# Patient Record
Sex: FEMALE | Race: OTHER | HISPANIC OR LATINO
[De-identification: names, ages, dates, MRNs, and addresses within clinical notes are randomized per-mention and may not be internally consistent; named-entity substitution may affect disease eponyms.]

---

## 2017-05-19 ENCOUNTER — LABORATORY RESULT (OUTPATIENT)
Age: 22
End: 2017-05-19

## 2017-05-19 ENCOUNTER — APPOINTMENT (OUTPATIENT)
Dept: MATERNAL FETAL MEDICINE | Facility: CLINIC | Age: 22
End: 2017-05-19

## 2017-05-19 ENCOUNTER — APPOINTMENT (OUTPATIENT)
Dept: OBGYN | Facility: CLINIC | Age: 22
End: 2017-05-19

## 2017-05-19 ENCOUNTER — OUTPATIENT (OUTPATIENT)
Dept: OUTPATIENT SERVICES | Facility: HOSPITAL | Age: 22
LOS: 1 days | End: 2017-05-19
Payer: COMMERCIAL

## 2017-05-19 VITALS
HEIGHT: 61 IN | BODY MASS INDEX: 26.66 KG/M2 | SYSTOLIC BLOOD PRESSURE: 106 MMHG | WEIGHT: 141.2 LBS | DIASTOLIC BLOOD PRESSURE: 70 MMHG

## 2017-05-19 VITALS — WEIGHT: 140 LBS | DIASTOLIC BLOOD PRESSURE: 80 MMHG | SYSTOLIC BLOOD PRESSURE: 118 MMHG

## 2017-05-19 DIAGNOSIS — Z34.90 ENCOUNTER FOR SUPERVISION OF NORMAL PREGNANCY, UNSPECIFIED, UNSPECIFIED TRIMESTER: ICD-10-CM

## 2017-05-19 DIAGNOSIS — Z98.89 OTHER SPECIFIED POSTPROCEDURAL STATES: Chronic | ICD-10-CM

## 2017-05-19 DIAGNOSIS — Z34.91 ENCOUNTER FOR SUPERVISION OF NORMAL PREGNANCY, UNSPECIFIED, FIRST TRIMESTER: ICD-10-CM

## 2017-05-19 DIAGNOSIS — O21.0 MILD HYPEREMESIS GRAVIDARUM: ICD-10-CM

## 2017-05-19 DIAGNOSIS — Z86.32 PERSONAL HISTORY OF GESTATIONAL DIABETES: ICD-10-CM

## 2017-05-19 LAB
HCT VFR BLD CALC: 39.2 % — SIGNIFICANT CHANGE UP (ref 34.5–45)
HGB BLD-MCNC: 12.5 G/DL — SIGNIFICANT CHANGE UP (ref 11.5–15.5)
MCHC RBC-ENTMCNC: 29.5 PG — SIGNIFICANT CHANGE UP (ref 27–34)
MCHC RBC-ENTMCNC: 31.9 GM/DL — LOW (ref 32–36)
MCV RBC AUTO: 92.5 FL — SIGNIFICANT CHANGE UP (ref 80–100)
PLATELET # BLD AUTO: 239 K/UL — SIGNIFICANT CHANGE UP (ref 150–400)
RBC # BLD: 4.24 M/UL — SIGNIFICANT CHANGE UP (ref 3.8–5.2)
RBC # FLD: 13.7 % — SIGNIFICANT CHANGE UP (ref 10.3–14.5)
WBC # BLD: 7.69 K/UL — SIGNIFICANT CHANGE UP (ref 3.8–10.5)
WBC # FLD AUTO: 7.69 K/UL — SIGNIFICANT CHANGE UP (ref 3.8–10.5)

## 2017-05-19 PROCEDURE — 86762 RUBELLA ANTIBODY: CPT

## 2017-05-19 PROCEDURE — 87389 HIV-1 AG W/HIV-1&-2 AB AG IA: CPT

## 2017-05-19 PROCEDURE — 83036 HEMOGLOBIN GLYCOSYLATED A1C: CPT

## 2017-05-19 PROCEDURE — 86592 SYPHILIS TEST NON-TREP QUAL: CPT

## 2017-05-19 PROCEDURE — 76817 TRANSVAGINAL US OBSTETRIC: CPT

## 2017-05-19 PROCEDURE — 84443 ASSAY THYROID STIM HORMONE: CPT

## 2017-05-19 PROCEDURE — G0463: CPT

## 2017-05-19 PROCEDURE — 86480 TB TEST CELL IMMUN MEASURE: CPT

## 2017-05-19 PROCEDURE — 87086 URINE CULTURE/COLONY COUNT: CPT

## 2017-05-19 PROCEDURE — 85027 COMPLETE CBC AUTOMATED: CPT

## 2017-05-19 PROCEDURE — 87340 HEPATITIS B SURFACE AG IA: CPT

## 2017-05-19 PROCEDURE — 82950 GLUCOSE TEST: CPT

## 2017-05-19 PROCEDURE — 86900 BLOOD TYPING SEROLOGIC ABO: CPT

## 2017-05-19 PROCEDURE — 83655 ASSAY OF LEAD: CPT

## 2017-05-19 PROCEDURE — 83020 HEMOGLOBIN ELECTROPHORESIS: CPT | Mod: 26

## 2017-05-19 PROCEDURE — 83020 HEMOGLOBIN ELECTROPHORESIS: CPT

## 2017-05-19 PROCEDURE — 86850 RBC ANTIBODY SCREEN: CPT

## 2017-05-20 LAB
C TRACH RRNA SPEC QL NAA+PROBE: SIGNIFICANT CHANGE UP
CULTURE RESULTS: NO GROWTH — SIGNIFICANT CHANGE UP
GLUCOSE 1H P MEAL SERPL-MCNC: 102 MG/DL — SIGNIFICANT CHANGE UP (ref 70–134)
HBA1C BLD-MCNC: 5.6 % — SIGNIFICANT CHANGE UP (ref 4–5.6)
HBV SURFACE AG SER-ACNC: SIGNIFICANT CHANGE UP
HIV 1+2 AB+HIV1 P24 AG SERPL QL IA: SIGNIFICANT CHANGE UP
N GONORRHOEA RRNA SPEC QL NAA+PROBE: SIGNIFICANT CHANGE UP
RPR SERPL-ACNC: SIGNIFICANT CHANGE UP
RUBV IGG SER-ACNC: 9.9 INDEX — SIGNIFICANT CHANGE UP
RUBV IGG SER-IMP: POSITIVE — SIGNIFICANT CHANGE UP
SPECIMEN SOURCE: SIGNIFICANT CHANGE UP
SPECIMEN SOURCE: SIGNIFICANT CHANGE UP
TSH SERPL-MCNC: 0.4 UIU/ML — SIGNIFICANT CHANGE UP (ref 0.27–4.2)

## 2017-05-21 LAB — LEAD BLD-MCNC: 1 UG/DL — SIGNIFICANT CHANGE UP (ref 0–19)

## 2017-05-22 LAB
HEMOGLOBIN INTERPRETATION: SIGNIFICANT CHANGE UP
HGB A MFR BLD: 46.2 % — LOW (ref 95.8–98)
HGB A2 MFR BLD: 2.6 % — SIGNIFICANT CHANGE UP (ref 2–3.2)
HGB F MFR BLD: 0.6 % — SIGNIFICANT CHANGE UP (ref 0–1)
HGB OTHER MFR BLD ELPH: 50.6 % — HIGH

## 2017-05-23 LAB
M TB TUBERC IFN-G BLD QL: -0.01 IU/ML — SIGNIFICANT CHANGE UP
M TB TUBERC IFN-G BLD QL: 0.05 IU/ML — SIGNIFICANT CHANGE UP
M TB TUBERC IFN-G BLD QL: NEGATIVE — SIGNIFICANT CHANGE UP
MITOGEN IGNF BCKGRD COR BLD-ACNC: >10 IU/ML — SIGNIFICANT CHANGE UP

## 2017-05-24 ENCOUNTER — ASOB RESULT (OUTPATIENT)
Age: 22
End: 2017-05-24

## 2017-05-24 ENCOUNTER — APPOINTMENT (OUTPATIENT)
Dept: ANTEPARTUM | Facility: CLINIC | Age: 22
End: 2017-05-24

## 2017-06-16 ENCOUNTER — LABORATORY RESULT (OUTPATIENT)
Age: 22
End: 2017-06-16

## 2017-06-16 ENCOUNTER — APPOINTMENT (OUTPATIENT)
Dept: OBGYN | Facility: CLINIC | Age: 22
End: 2017-06-16

## 2017-06-16 ENCOUNTER — OUTPATIENT (OUTPATIENT)
Dept: OUTPATIENT SERVICES | Facility: HOSPITAL | Age: 22
LOS: 1 days | End: 2017-06-16
Payer: COMMERCIAL

## 2017-06-16 VITALS — WEIGHT: 138 LBS | DIASTOLIC BLOOD PRESSURE: 70 MMHG | SYSTOLIC BLOOD PRESSURE: 104 MMHG

## 2017-06-16 DIAGNOSIS — N94.89 OTHER SPECIFIED CONDITIONS ASSOCIATED WITH FEMALE GENITAL ORGANS AND MENSTRUAL CYCLE: ICD-10-CM

## 2017-06-16 DIAGNOSIS — Z34.80 ENCOUNTER FOR SUPERVISION OF OTHER NORMAL PREGNANCY, UNSPECIFIED TRIMESTER: ICD-10-CM

## 2017-06-16 DIAGNOSIS — N39.0 URINARY TRACT INFECTION, SITE NOT SPECIFIED: ICD-10-CM

## 2017-06-16 DIAGNOSIS — Z98.89 OTHER SPECIFIED POSTPROCEDURAL STATES: Chronic | ICD-10-CM

## 2017-06-16 DIAGNOSIS — L29.9 PRURITUS, UNSPECIFIED: ICD-10-CM

## 2017-06-16 PROCEDURE — 86336 INHIBIN A: CPT

## 2017-06-16 PROCEDURE — G0463: CPT

## 2017-06-16 PROCEDURE — 81003 URINALYSIS AUTO W/O SCOPE: CPT

## 2017-06-20 LAB
1ST TRIMESTER DATA: SIGNIFICANT CHANGE UP
2ND TRIMESTER DATA: SIGNIFICANT CHANGE UP
AFP AMN-MCNC: SIGNIFICANT CHANGE UP
AFP SERPL-ACNC: SIGNIFICANT CHANGE UP
B-HCG FREE SERPL-MCNC: SIGNIFICANT CHANGE UP
B-HCG FREE SERPL-MCNC: SIGNIFICANT CHANGE UP
CLINICAL BIOCHEMIST REVIEW: SIGNIFICANT CHANGE UP
DEMOGRAPHIC DATA: SIGNIFICANT CHANGE UP
INHIBIN A SERPL-MCNC: SIGNIFICANT CHANGE UP
NT: SIGNIFICANT CHANGE UP
PAPP-A SERPL-ACNC: SIGNIFICANT CHANGE UP
SCREEN-FOOTER: SIGNIFICANT CHANGE UP
U ESTRIOL SERPL-SCNC: SIGNIFICANT CHANGE UP

## 2017-06-22 DIAGNOSIS — Z34.90 ENCOUNTER FOR SUPERVISION OF NORMAL PREGNANCY, UNSPECIFIED, UNSPECIFIED TRIMESTER: ICD-10-CM

## 2017-06-22 DIAGNOSIS — Z34.81 ENCOUNTER FOR SUPERVISION OF OTHER NORMAL PREGNANCY, FIRST TRIMESTER: ICD-10-CM

## 2017-06-22 DIAGNOSIS — J01.90 ACUTE SINUSITIS, UNSPECIFIED: ICD-10-CM

## 2017-07-12 ENCOUNTER — OUTPATIENT (OUTPATIENT)
Dept: OUTPATIENT SERVICES | Facility: HOSPITAL | Age: 22
LOS: 1 days | End: 2017-07-12
Payer: COMMERCIAL

## 2017-07-12 ENCOUNTER — APPOINTMENT (OUTPATIENT)
Dept: OBGYN | Facility: CLINIC | Age: 22
End: 2017-07-12

## 2017-07-12 ENCOUNTER — APPOINTMENT (OUTPATIENT)
Dept: ANTEPARTUM | Facility: CLINIC | Age: 22
End: 2017-07-12

## 2017-07-12 ENCOUNTER — ASOB RESULT (OUTPATIENT)
Age: 22
End: 2017-07-12

## 2017-07-12 VITALS — DIASTOLIC BLOOD PRESSURE: 62 MMHG | WEIGHT: 146 LBS | SYSTOLIC BLOOD PRESSURE: 110 MMHG

## 2017-07-12 DIAGNOSIS — Z34.80 ENCOUNTER FOR SUPERVISION OF OTHER NORMAL PREGNANCY, UNSPECIFIED TRIMESTER: ICD-10-CM

## 2017-07-12 DIAGNOSIS — Z98.89 OTHER SPECIFIED POSTPROCEDURAL STATES: Chronic | ICD-10-CM

## 2017-07-12 PROCEDURE — G0463: CPT

## 2017-07-17 DIAGNOSIS — Z34.81 ENCOUNTER FOR SUPERVISION OF OTHER NORMAL PREGNANCY, FIRST TRIMESTER: ICD-10-CM

## 2017-07-18 ENCOUNTER — TRANSCRIPTION ENCOUNTER (OUTPATIENT)
Age: 22
End: 2017-07-18

## 2017-07-19 LAB
ALBUMIN SERPL ELPH-MCNC: 3.7 G/DL
ALP BLD-CCNC: 86 U/L
ALT SERPL-CCNC: 7 U/L
ANION GAP SERPL CALC-SCNC: 15 MMOL/L
AST SERPL-CCNC: 16 U/L
BILIRUB SERPL-MCNC: 0.2 MG/DL
BUN SERPL-MCNC: 8 MG/DL
CALCIUM SERPL-MCNC: 9.1 MG/DL
CHLORIDE SERPL-SCNC: 99 MMOL/L
CO2 SERPL-SCNC: 20 MMOL/L
CREAT SERPL-MCNC: 0.37 MG/DL
GLUCOSE SERPL-MCNC: 89 MG/DL
POTASSIUM SERPL-SCNC: 3.5 MMOL/L
PROT SERPL-MCNC: 7.2 G/DL
SODIUM SERPL-SCNC: 134 MMOL/L

## 2017-08-09 ENCOUNTER — LABORATORY RESULT (OUTPATIENT)
Age: 22
End: 2017-08-09

## 2017-08-09 ENCOUNTER — OUTPATIENT (OUTPATIENT)
Dept: OUTPATIENT SERVICES | Facility: HOSPITAL | Age: 22
LOS: 1 days | End: 2017-08-09
Payer: COMMERCIAL

## 2017-08-09 ENCOUNTER — NON-APPOINTMENT (OUTPATIENT)
Age: 22
End: 2017-08-09

## 2017-08-09 ENCOUNTER — APPOINTMENT (OUTPATIENT)
Dept: OBGYN | Facility: CLINIC | Age: 22
End: 2017-08-09
Payer: COMMERCIAL

## 2017-08-09 VITALS
DIASTOLIC BLOOD PRESSURE: 60 MMHG | BODY MASS INDEX: 28.13 KG/M2 | WEIGHT: 149 LBS | HEIGHT: 61 IN | SYSTOLIC BLOOD PRESSURE: 102 MMHG

## 2017-08-09 DIAGNOSIS — Z34.80 ENCOUNTER FOR SUPERVISION OF OTHER NORMAL PREGNANCY, UNSPECIFIED TRIMESTER: ICD-10-CM

## 2017-08-09 DIAGNOSIS — Z98.89 OTHER SPECIFIED POSTPROCEDURAL STATES: Chronic | ICD-10-CM

## 2017-08-09 PROCEDURE — 81003 URINALYSIS AUTO W/O SCOPE: CPT | Mod: QW,NC

## 2017-08-09 PROCEDURE — 81003 URINALYSIS AUTO W/O SCOPE: CPT

## 2017-08-09 PROCEDURE — 82950 GLUCOSE TEST: CPT

## 2017-08-09 PROCEDURE — 99213 OFFICE O/P EST LOW 20 MIN: CPT | Mod: NC

## 2017-08-09 PROCEDURE — G0463: CPT

## 2017-08-10 LAB — GLUCOSE 1H P MEAL SERPL-MCNC: 131 MG/DL — SIGNIFICANT CHANGE UP (ref 70–134)

## 2017-08-17 DIAGNOSIS — Z34.92 ENCOUNTER FOR SUPERVISION OF NORMAL PREGNANCY, UNSPECIFIED, SECOND TRIMESTER: ICD-10-CM

## 2017-08-23 ENCOUNTER — APPOINTMENT (OUTPATIENT)
Dept: ANTEPARTUM | Facility: CLINIC | Age: 22
End: 2017-08-23
Payer: COMMERCIAL

## 2017-08-23 ENCOUNTER — ASOB RESULT (OUTPATIENT)
Age: 22
End: 2017-08-23

## 2017-08-23 PROCEDURE — 76816 OB US FOLLOW-UP PER FETUS: CPT

## 2017-08-28 ENCOUNTER — APPOINTMENT (OUTPATIENT)
Dept: OBGYN | Facility: CLINIC | Age: 22
End: 2017-08-28
Payer: COMMERCIAL

## 2017-08-28 ENCOUNTER — LABORATORY RESULT (OUTPATIENT)
Age: 22
End: 2017-08-28

## 2017-08-28 ENCOUNTER — OUTPATIENT (OUTPATIENT)
Dept: OUTPATIENT SERVICES | Facility: HOSPITAL | Age: 22
LOS: 1 days | End: 2017-08-28
Payer: COMMERCIAL

## 2017-08-28 VITALS
SYSTOLIC BLOOD PRESSURE: 102 MMHG | WEIGHT: 148.13 LBS | BODY MASS INDEX: 27.97 KG/M2 | DIASTOLIC BLOOD PRESSURE: 60 MMHG | HEIGHT: 61 IN

## 2017-08-28 DIAGNOSIS — Z98.89 OTHER SPECIFIED POSTPROCEDURAL STATES: Chronic | ICD-10-CM

## 2017-08-28 DIAGNOSIS — Z34.93 ENCOUNTER FOR SUPERVISION OF NORMAL PREGNANCY, UNSPECIFIED, THIRD TRIMESTER: ICD-10-CM

## 2017-08-28 DIAGNOSIS — Z34.80 ENCOUNTER FOR SUPERVISION OF OTHER NORMAL PREGNANCY, UNSPECIFIED TRIMESTER: ICD-10-CM

## 2017-08-28 LAB
HCT VFR BLD CALC: 35.4 % — SIGNIFICANT CHANGE UP (ref 34.5–45)
HGB BLD-MCNC: 10.9 G/DL — LOW (ref 11.5–15.5)
MCHC RBC-ENTMCNC: 28.8 PG — SIGNIFICANT CHANGE UP (ref 27–34)
MCHC RBC-ENTMCNC: 30.8 GM/DL — LOW (ref 32–36)
MCV RBC AUTO: 93.4 FL — SIGNIFICANT CHANGE UP (ref 80–100)
PLATELET # BLD AUTO: 234 K/UL — SIGNIFICANT CHANGE UP (ref 150–400)
RBC # BLD: 3.79 M/UL — LOW (ref 3.8–5.2)
RBC # FLD: 13.2 % — SIGNIFICANT CHANGE UP (ref 10.3–14.5)
WBC # BLD: 7.94 K/UL — SIGNIFICANT CHANGE UP (ref 3.8–10.5)
WBC # FLD AUTO: 7.94 K/UL — SIGNIFICANT CHANGE UP (ref 3.8–10.5)

## 2017-08-28 PROCEDURE — 81003 URINALYSIS AUTO W/O SCOPE: CPT | Mod: QW,NC

## 2017-08-28 PROCEDURE — 87389 HIV-1 AG W/HIV-1&-2 AB AG IA: CPT

## 2017-08-28 PROCEDURE — 99213 OFFICE O/P EST LOW 20 MIN: CPT | Mod: NC

## 2017-08-28 PROCEDURE — 85027 COMPLETE CBC AUTOMATED: CPT

## 2017-08-28 PROCEDURE — G0463: CPT

## 2017-08-28 PROCEDURE — 81003 URINALYSIS AUTO W/O SCOPE: CPT

## 2017-08-29 LAB — HIV 1+2 AB+HIV1 P24 AG SERPL QL IA: SIGNIFICANT CHANGE UP

## 2017-08-31 ENCOUNTER — APPOINTMENT (OUTPATIENT)
Dept: OBGYN | Facility: CLINIC | Age: 22
End: 2017-08-31

## 2017-09-13 ENCOUNTER — ASOB RESULT (OUTPATIENT)
Age: 22
End: 2017-09-13

## 2017-09-13 ENCOUNTER — APPOINTMENT (OUTPATIENT)
Dept: ANTEPARTUM | Facility: CLINIC | Age: 22
End: 2017-09-13
Payer: COMMERCIAL

## 2017-09-13 PROCEDURE — 76816 OB US FOLLOW-UP PER FETUS: CPT

## 2017-09-18 ENCOUNTER — APPOINTMENT (OUTPATIENT)
Dept: OBGYN | Facility: CLINIC | Age: 22
End: 2017-09-18
Payer: COMMERCIAL

## 2017-09-18 ENCOUNTER — OUTPATIENT (OUTPATIENT)
Dept: OUTPATIENT SERVICES | Facility: HOSPITAL | Age: 22
LOS: 1 days | End: 2017-09-18
Payer: COMMERCIAL

## 2017-09-18 VITALS — SYSTOLIC BLOOD PRESSURE: 110 MMHG | WEIGHT: 151 LBS | DIASTOLIC BLOOD PRESSURE: 70 MMHG

## 2017-09-18 DIAGNOSIS — Z34.80 ENCOUNTER FOR SUPERVISION OF OTHER NORMAL PREGNANCY, UNSPECIFIED TRIMESTER: ICD-10-CM

## 2017-09-18 DIAGNOSIS — Z98.89 OTHER SPECIFIED POSTPROCEDURAL STATES: Chronic | ICD-10-CM

## 2017-09-18 PROCEDURE — 81002 URINALYSIS NONAUTO W/O SCOPE: CPT

## 2017-09-18 PROCEDURE — 81002 URINALYSIS NONAUTO W/O SCOPE: CPT | Mod: NC

## 2017-09-18 PROCEDURE — 99213 OFFICE O/P EST LOW 20 MIN: CPT | Mod: NC

## 2017-09-18 PROCEDURE — G0463: CPT

## 2017-09-22 DIAGNOSIS — M54.30 SCIATICA, UNSPECIFIED SIDE: ICD-10-CM

## 2017-09-22 DIAGNOSIS — Z34.93 ENCOUNTER FOR SUPERVISION OF NORMAL PREGNANCY, UNSPECIFIED, THIRD TRIMESTER: ICD-10-CM

## 2017-10-02 ENCOUNTER — OUTPATIENT (OUTPATIENT)
Dept: OUTPATIENT SERVICES | Facility: HOSPITAL | Age: 22
LOS: 1 days | End: 2017-10-02
Payer: MEDICAID

## 2017-10-02 ENCOUNTER — APPOINTMENT (OUTPATIENT)
Dept: OBGYN | Facility: CLINIC | Age: 22
End: 2017-10-02
Payer: SELF-PAY

## 2017-10-02 VITALS — WEIGHT: 153 LBS | SYSTOLIC BLOOD PRESSURE: 110 MMHG | DIASTOLIC BLOOD PRESSURE: 70 MMHG

## 2017-10-02 DIAGNOSIS — N76.0 ACUTE VAGINITIS: ICD-10-CM

## 2017-10-02 DIAGNOSIS — Z34.81 ENCOUNTER FOR SUPERVISION OF OTHER NORMAL PREGNANCY, FIRST TRIMESTER: ICD-10-CM

## 2017-10-02 DIAGNOSIS — Z98.89 OTHER SPECIFIED POSTPROCEDURAL STATES: Chronic | ICD-10-CM

## 2017-10-02 DIAGNOSIS — M54.30 SCIATICA, UNSPECIFIED SIDE: ICD-10-CM

## 2017-10-02 PROCEDURE — 81002 URINALYSIS NONAUTO W/O SCOPE: CPT

## 2017-10-02 PROCEDURE — G0463: CPT

## 2017-10-02 PROCEDURE — 99213 OFFICE O/P EST LOW 20 MIN: CPT | Mod: NC

## 2017-10-02 PROCEDURE — 81002 URINALYSIS NONAUTO W/O SCOPE: CPT | Mod: NC

## 2017-10-06 DIAGNOSIS — R12 HEARTBURN: ICD-10-CM

## 2017-10-06 DIAGNOSIS — Z34.93 ENCOUNTER FOR SUPERVISION OF NORMAL PREGNANCY, UNSPECIFIED, THIRD TRIMESTER: ICD-10-CM

## 2017-10-06 DIAGNOSIS — M54.30 SCIATICA, UNSPECIFIED SIDE: ICD-10-CM

## 2017-10-11 ENCOUNTER — ASOB RESULT (OUTPATIENT)
Age: 22
End: 2017-10-11

## 2017-10-11 ENCOUNTER — APPOINTMENT (OUTPATIENT)
Dept: ANTEPARTUM | Facility: CLINIC | Age: 22
End: 2017-10-11
Payer: MEDICAID

## 2017-10-11 PROCEDURE — 76816 OB US FOLLOW-UP PER FETUS: CPT

## 2017-10-23 ENCOUNTER — LABORATORY RESULT (OUTPATIENT)
Age: 22
End: 2017-10-23

## 2017-10-23 ENCOUNTER — OUTPATIENT (OUTPATIENT)
Dept: OUTPATIENT SERVICES | Facility: HOSPITAL | Age: 22
LOS: 1 days | End: 2017-10-23
Payer: MEDICAID

## 2017-10-23 ENCOUNTER — APPOINTMENT (OUTPATIENT)
Dept: OBGYN | Facility: CLINIC | Age: 22
End: 2017-10-23
Payer: COMMERCIAL

## 2017-10-23 ENCOUNTER — MED ADMIN CHARGE (OUTPATIENT)
Age: 22
End: 2017-10-23

## 2017-10-23 VITALS — WEIGHT: 155 LBS | DIASTOLIC BLOOD PRESSURE: 80 MMHG | SYSTOLIC BLOOD PRESSURE: 120 MMHG

## 2017-10-23 DIAGNOSIS — Z34.80 ENCOUNTER FOR SUPERVISION OF OTHER NORMAL PREGNANCY, UNSPECIFIED TRIMESTER: ICD-10-CM

## 2017-10-23 DIAGNOSIS — Z34.93 ENCOUNTER FOR SUPERVISION OF NORMAL PREGNANCY, UNSPECIFIED, THIRD TRIMESTER: ICD-10-CM

## 2017-10-23 DIAGNOSIS — Z23 ENCOUNTER FOR IMMUNIZATION: ICD-10-CM

## 2017-10-23 DIAGNOSIS — Z98.89 OTHER SPECIFIED POSTPROCEDURAL STATES: Chronic | ICD-10-CM

## 2017-10-23 DIAGNOSIS — R11.2 NAUSEA WITH VOMITING, UNSPECIFIED: ICD-10-CM

## 2017-10-23 LAB
ALBUMIN SERPL ELPH-MCNC: 3.5 G/DL — SIGNIFICANT CHANGE UP (ref 3.3–5)
ALP SERPL-CCNC: 223 U/L — HIGH (ref 40–120)
ALT FLD-CCNC: 12 U/L — SIGNIFICANT CHANGE UP (ref 10–45)
ANION GAP SERPL CALC-SCNC: 13 MMOL/L — SIGNIFICANT CHANGE UP (ref 5–17)
AST SERPL-CCNC: 12 U/L — SIGNIFICANT CHANGE UP (ref 10–40)
BILIRUB SERPL-MCNC: 0.2 MG/DL — SIGNIFICANT CHANGE UP (ref 0.2–1.2)
BUN SERPL-MCNC: 6 MG/DL — LOW (ref 7–23)
CALCIUM SERPL-MCNC: 9.1 MG/DL — SIGNIFICANT CHANGE UP (ref 8.4–10.5)
CHLORIDE SERPL-SCNC: 100 MMOL/L — SIGNIFICANT CHANGE UP (ref 96–108)
CO2 SERPL-SCNC: 20 MMOL/L — LOW (ref 22–31)
CREAT SERPL-MCNC: 0.46 MG/DL — LOW (ref 0.5–1.3)
GLUCOSE SERPL-MCNC: 100 MG/DL — HIGH (ref 70–99)
LDH SERPL L TO P-CCNC: 147 U/L — SIGNIFICANT CHANGE UP (ref 50–242)
POTASSIUM SERPL-MCNC: 4.1 MMOL/L — SIGNIFICANT CHANGE UP (ref 3.5–5.3)
POTASSIUM SERPL-SCNC: 4.1 MMOL/L — SIGNIFICANT CHANGE UP (ref 3.5–5.3)
PROT SERPL-MCNC: 6.8 G/DL — SIGNIFICANT CHANGE UP (ref 6–8.3)
SODIUM SERPL-SCNC: 133 MMOL/L — LOW (ref 135–145)
URATE SERPL-MCNC: 2.5 MG/DL — SIGNIFICANT CHANGE UP (ref 2.5–7)

## 2017-10-23 PROCEDURE — 84550 ASSAY OF BLOOD/URIC ACID: CPT

## 2017-10-23 PROCEDURE — 80053 COMPREHEN METABOLIC PANEL: CPT

## 2017-10-23 PROCEDURE — 83615 LACTATE (LD) (LDH) ENZYME: CPT

## 2017-10-23 PROCEDURE — 81002 URINALYSIS NONAUTO W/O SCOPE: CPT

## 2017-10-23 PROCEDURE — 90715 TDAP VACCINE 7 YRS/> IM: CPT

## 2017-10-23 PROCEDURE — 90471 IMMUNIZATION ADMIN: CPT | Mod: NC

## 2017-10-23 PROCEDURE — 85027 COMPLETE CBC AUTOMATED: CPT

## 2017-10-23 PROCEDURE — 90471 IMMUNIZATION ADMIN: CPT

## 2017-10-23 PROCEDURE — 99213 OFFICE O/P EST LOW 20 MIN: CPT | Mod: 25,NC

## 2017-10-23 PROCEDURE — G0463: CPT

## 2017-10-23 PROCEDURE — 81002 URINALYSIS NONAUTO W/O SCOPE: CPT | Mod: NC

## 2017-10-23 PROCEDURE — 90715 TDAP VACCINE 7 YRS/> IM: CPT | Mod: NC

## 2017-10-24 LAB
HCT VFR BLD CALC: 36 % — SIGNIFICANT CHANGE UP (ref 34.5–45)
HGB BLD-MCNC: 11.3 G/DL — LOW (ref 11.5–15.5)
MCHC RBC-ENTMCNC: 28.8 PG — SIGNIFICANT CHANGE UP (ref 27–34)
MCHC RBC-ENTMCNC: 31.4 GM/DL — LOW (ref 32–36)
MCV RBC AUTO: 91.6 FL — SIGNIFICANT CHANGE UP (ref 80–100)
PLATELET # BLD AUTO: 246 K/UL — SIGNIFICANT CHANGE UP (ref 150–400)
RBC # BLD: 3.93 M/UL — SIGNIFICANT CHANGE UP (ref 3.8–5.2)
RBC # FLD: 13.7 % — SIGNIFICANT CHANGE UP (ref 10.3–14.5)
WBC # BLD: 7.3 K/UL — SIGNIFICANT CHANGE UP (ref 3.8–10.5)
WBC # FLD AUTO: 7.3 K/UL — SIGNIFICANT CHANGE UP (ref 3.8–10.5)

## 2017-10-30 ENCOUNTER — OUTPATIENT (OUTPATIENT)
Dept: OUTPATIENT SERVICES | Facility: HOSPITAL | Age: 22
LOS: 1 days | End: 2017-10-30
Payer: MEDICAID

## 2017-10-30 ENCOUNTER — APPOINTMENT (OUTPATIENT)
Dept: OBGYN | Facility: CLINIC | Age: 22
End: 2017-10-30
Payer: MEDICAID

## 2017-10-30 ENCOUNTER — LABORATORY RESULT (OUTPATIENT)
Age: 22
End: 2017-10-30

## 2017-10-30 VITALS
SYSTOLIC BLOOD PRESSURE: 100 MMHG | WEIGHT: 157 LBS | DIASTOLIC BLOOD PRESSURE: 60 MMHG | BODY MASS INDEX: 29.64 KG/M2 | HEIGHT: 61 IN

## 2017-10-30 DIAGNOSIS — Z34.80 ENCOUNTER FOR SUPERVISION OF OTHER NORMAL PREGNANCY, UNSPECIFIED TRIMESTER: ICD-10-CM

## 2017-10-30 DIAGNOSIS — Z98.89 OTHER SPECIFIED POSTPROCEDURAL STATES: Chronic | ICD-10-CM

## 2017-10-30 PROCEDURE — 99213 OFFICE O/P EST LOW 20 MIN: CPT | Mod: NC

## 2017-10-30 PROCEDURE — G0463: CPT

## 2017-10-30 PROCEDURE — 81002 URINALYSIS NONAUTO W/O SCOPE: CPT | Mod: NC

## 2017-10-30 PROCEDURE — 81002 URINALYSIS NONAUTO W/O SCOPE: CPT

## 2017-10-30 PROCEDURE — 87653 STREP B DNA AMP PROBE: CPT

## 2017-11-01 LAB
GROUP B BETA STREP DNA (PCR): SIGNIFICANT CHANGE UP
GROUP B BETA STREP INTERPRETATION: SIGNIFICANT CHANGE UP
SOURCE GROUP B STREP: SIGNIFICANT CHANGE UP

## 2017-11-06 ENCOUNTER — APPOINTMENT (OUTPATIENT)
Dept: OBGYN | Facility: CLINIC | Age: 22
End: 2017-11-06

## 2017-11-06 DIAGNOSIS — Z34.93 ENCOUNTER FOR SUPERVISION OF NORMAL PREGNANCY, UNSPECIFIED, THIRD TRIMESTER: ICD-10-CM

## 2017-11-13 ENCOUNTER — APPOINTMENT (OUTPATIENT)
Dept: OBGYN | Facility: CLINIC | Age: 22
End: 2017-11-13
Payer: MEDICAID

## 2017-11-13 ENCOUNTER — OUTPATIENT (OUTPATIENT)
Dept: OUTPATIENT SERVICES | Facility: HOSPITAL | Age: 22
LOS: 1 days | End: 2017-11-13
Payer: MEDICAID

## 2017-11-13 VITALS — WEIGHT: 158 LBS | DIASTOLIC BLOOD PRESSURE: 62 MMHG | SYSTOLIC BLOOD PRESSURE: 102 MMHG

## 2017-11-13 DIAGNOSIS — Z98.89 OTHER SPECIFIED POSTPROCEDURAL STATES: Chronic | ICD-10-CM

## 2017-11-13 DIAGNOSIS — Z34.93 ENCOUNTER FOR SUPERVISION OF NORMAL PREGNANCY, UNSPECIFIED, THIRD TRIMESTER: ICD-10-CM

## 2017-11-13 DIAGNOSIS — Z34.80 ENCOUNTER FOR SUPERVISION OF OTHER NORMAL PREGNANCY, UNSPECIFIED TRIMESTER: ICD-10-CM

## 2017-11-13 PROCEDURE — G0463: CPT

## 2017-11-13 PROCEDURE — 81002 URINALYSIS NONAUTO W/O SCOPE: CPT | Mod: NC

## 2017-11-13 PROCEDURE — 99213 OFFICE O/P EST LOW 20 MIN: CPT | Mod: NC

## 2017-11-13 PROCEDURE — 81002 URINALYSIS NONAUTO W/O SCOPE: CPT

## 2017-11-15 ENCOUNTER — ASOB RESULT (OUTPATIENT)
Age: 22
End: 2017-11-15

## 2017-11-15 ENCOUNTER — APPOINTMENT (OUTPATIENT)
Dept: ANTEPARTUM | Facility: CLINIC | Age: 22
End: 2017-11-15

## 2017-11-15 ENCOUNTER — OUTPATIENT (OUTPATIENT)
Dept: OUTPATIENT SERVICES | Facility: HOSPITAL | Age: 22
LOS: 1 days | End: 2017-11-15
Payer: MEDICAID

## 2017-11-15 ENCOUNTER — APPOINTMENT (OUTPATIENT)
Dept: OBGYN | Facility: CLINIC | Age: 22
End: 2017-11-15
Payer: COMMERCIAL

## 2017-11-15 ENCOUNTER — APPOINTMENT (OUTPATIENT)
Dept: ANTEPARTUM | Facility: CLINIC | Age: 22
End: 2017-11-15
Payer: COMMERCIAL

## 2017-11-15 VITALS
HEIGHT: 61 IN | BODY MASS INDEX: 30.04 KG/M2 | WEIGHT: 159.13 LBS | SYSTOLIC BLOOD PRESSURE: 100 MMHG | DIASTOLIC BLOOD PRESSURE: 70 MMHG

## 2017-11-15 DIAGNOSIS — Z98.89 OTHER SPECIFIED POSTPROCEDURAL STATES: Chronic | ICD-10-CM

## 2017-11-15 DIAGNOSIS — Z34.80 ENCOUNTER FOR SUPERVISION OF OTHER NORMAL PREGNANCY, UNSPECIFIED TRIMESTER: ICD-10-CM

## 2017-11-15 DIAGNOSIS — Z34.90 ENCOUNTER FOR SUPERVISION OF NORMAL PREGNANCY, UNSPECIFIED, UNSPECIFIED TRIMESTER: ICD-10-CM

## 2017-11-15 PROCEDURE — 76820 UMBILICAL ARTERY ECHO: CPT

## 2017-11-15 PROCEDURE — 76818 FETAL BIOPHYS PROFILE W/NST: CPT

## 2017-11-15 PROCEDURE — 76816 OB US FOLLOW-UP PER FETUS: CPT

## 2017-11-15 PROCEDURE — 99213 OFFICE O/P EST LOW 20 MIN: CPT | Mod: NC

## 2017-11-15 PROCEDURE — G0463: CPT

## 2017-11-17 ENCOUNTER — APPOINTMENT (OUTPATIENT)
Dept: ANTEPARTUM | Facility: CLINIC | Age: 22
End: 2017-11-17
Payer: COMMERCIAL

## 2017-11-17 ENCOUNTER — ASOB RESULT (OUTPATIENT)
Age: 22
End: 2017-11-17

## 2017-11-17 ENCOUNTER — OUTPATIENT (OUTPATIENT)
Dept: OUTPATIENT SERVICES | Facility: HOSPITAL | Age: 22
LOS: 1 days | End: 2017-11-17
Payer: MEDICAID

## 2017-11-17 DIAGNOSIS — Z01.818 ENCOUNTER FOR OTHER PREPROCEDURAL EXAMINATION: ICD-10-CM

## 2017-11-17 DIAGNOSIS — Z98.89 OTHER SPECIFIED POSTPROCEDURAL STATES: Chronic | ICD-10-CM

## 2017-11-17 PROCEDURE — 76818 FETAL BIOPHYS PROFILE W/NST: CPT | Mod: 26

## 2017-11-17 PROCEDURE — 76818 FETAL BIOPHYS PROFILE W/NST: CPT

## 2017-11-20 ENCOUNTER — INPATIENT (INPATIENT)
Facility: HOSPITAL | Age: 22
LOS: 2 days | Discharge: HOME | End: 2017-11-23
Attending: OBSTETRICS & GYNECOLOGY | Admitting: OBSTETRICS & GYNECOLOGY
Payer: MEDICAID

## 2017-11-20 ENCOUNTER — APPOINTMENT (OUTPATIENT)
Dept: OBGYN | Facility: CLINIC | Age: 22
End: 2017-11-20

## 2017-11-20 VITALS — HEIGHT: 61 IN | WEIGHT: 147.71 LBS

## 2017-11-20 DIAGNOSIS — O36.5930 MATERNAL CARE FOR OTHER KNOWN OR SUSPECTED POOR FETAL GROWTH, THIRD TRIMESTER, NOT APPLICABLE OR UNSPECIFIED: ICD-10-CM

## 2017-11-20 DIAGNOSIS — Z98.89 OTHER SPECIFIED POSTPROCEDURAL STATES: Chronic | ICD-10-CM

## 2017-11-20 DIAGNOSIS — O09.293 SUPERVISION OF PREGNANCY WITH OTHER POOR REPRODUCTIVE OR OBSTETRIC HISTORY, THIRD TRIMESTER: ICD-10-CM

## 2017-11-20 LAB
ALBUMIN SERPL ELPH-MCNC: 3.3 G/DL — SIGNIFICANT CHANGE UP (ref 3.3–5)
ALP SERPL-CCNC: 232 U/L — HIGH (ref 40–120)
ALT FLD-CCNC: 10 U/L RC — SIGNIFICANT CHANGE UP (ref 10–45)
ANION GAP SERPL CALC-SCNC: 13 MMOL/L — SIGNIFICANT CHANGE UP (ref 5–17)
APPEARANCE UR: CLEAR — SIGNIFICANT CHANGE UP
APTT BLD: 24.6 SEC — LOW (ref 27.5–37.4)
AST SERPL-CCNC: 15 U/L — SIGNIFICANT CHANGE UP (ref 10–40)
BASOPHILS # BLD AUTO: 0 K/UL — SIGNIFICANT CHANGE UP (ref 0–0.2)
BASOPHILS NFR BLD AUTO: 0.5 % — SIGNIFICANT CHANGE UP (ref 0–2)
BILIRUB SERPL-MCNC: 0.1 MG/DL — LOW (ref 0.2–1.2)
BILIRUB UR-MCNC: NEGATIVE — SIGNIFICANT CHANGE UP
BLD GP AB SCN SERPL QL: NEGATIVE — SIGNIFICANT CHANGE UP
BUN SERPL-MCNC: 7 MG/DL — SIGNIFICANT CHANGE UP (ref 7–23)
CALCIUM SERPL-MCNC: 8.8 MG/DL — SIGNIFICANT CHANGE UP (ref 8.4–10.5)
CHLORIDE SERPL-SCNC: 103 MMOL/L — SIGNIFICANT CHANGE UP (ref 96–108)
CO2 SERPL-SCNC: 22 MMOL/L — SIGNIFICANT CHANGE UP (ref 22–31)
COLOR SPEC: YELLOW — SIGNIFICANT CHANGE UP
CREAT SERPL-MCNC: 0.56 MG/DL — SIGNIFICANT CHANGE UP (ref 0.5–1.3)
DIFF PNL FLD: NEGATIVE — SIGNIFICANT CHANGE UP
EOSINOPHIL # BLD AUTO: 0.1 K/UL — SIGNIFICANT CHANGE UP (ref 0–0.5)
EOSINOPHIL NFR BLD AUTO: 1.2 % — SIGNIFICANT CHANGE UP (ref 0–6)
FIBRINOGEN PPP-MCNC: 677 MG/DL — HIGH (ref 310–510)
GLUCOSE SERPL-MCNC: 120 MG/DL — HIGH (ref 70–99)
GLUCOSE UR QL: 50
HCT VFR BLD CALC: 32 % — LOW (ref 34.5–45)
HGB BLD-MCNC: 10.7 G/DL — LOW (ref 11.5–15.5)
INR BLD: 0.96 RATIO — SIGNIFICANT CHANGE UP (ref 0.88–1.16)
KETONES UR-MCNC: ABNORMAL
LDH SERPL L TO P-CCNC: 153 U/L — SIGNIFICANT CHANGE UP (ref 50–242)
LEUKOCYTE ESTERASE UR-ACNC: ABNORMAL
LYMPHOCYTES # BLD AUTO: 1.5 K/UL — SIGNIFICANT CHANGE UP (ref 1–3.3)
LYMPHOCYTES # BLD AUTO: 20.9 % — SIGNIFICANT CHANGE UP (ref 13–44)
MCHC RBC-ENTMCNC: 29.9 PG — SIGNIFICANT CHANGE UP (ref 27–34)
MCHC RBC-ENTMCNC: 33.6 GM/DL — SIGNIFICANT CHANGE UP (ref 32–36)
MCV RBC AUTO: 89 FL — SIGNIFICANT CHANGE UP (ref 80–100)
MONOCYTES # BLD AUTO: 0.7 K/UL — SIGNIFICANT CHANGE UP (ref 0–0.9)
MONOCYTES NFR BLD AUTO: 9.2 % — SIGNIFICANT CHANGE UP (ref 2–14)
NEUTROPHILS # BLD AUTO: 5 K/UL — SIGNIFICANT CHANGE UP (ref 1.8–7.4)
NEUTROPHILS NFR BLD AUTO: 68.2 % — SIGNIFICANT CHANGE UP (ref 43–77)
NITRITE UR-MCNC: NEGATIVE — SIGNIFICANT CHANGE UP
PH UR: 6.5 — SIGNIFICANT CHANGE UP (ref 5–8)
PLATELET # BLD AUTO: 197 K/UL — SIGNIFICANT CHANGE UP (ref 150–400)
POTASSIUM SERPL-MCNC: 3.4 MMOL/L — LOW (ref 3.5–5.3)
POTASSIUM SERPL-SCNC: 3.4 MMOL/L — LOW (ref 3.5–5.3)
PROT SERPL-MCNC: 6.5 G/DL — SIGNIFICANT CHANGE UP (ref 6–8.3)
PROT UR-MCNC: 100 MG/DL
PROTHROM AB SERPL-ACNC: 10.4 SEC — SIGNIFICANT CHANGE UP (ref 9.8–12.7)
RBC # BLD: 3.59 M/UL — LOW (ref 3.8–5.2)
RBC # FLD: 13 % — SIGNIFICANT CHANGE UP (ref 10.3–14.5)
RH IG SCN BLD-IMP: POSITIVE — SIGNIFICANT CHANGE UP
SODIUM SERPL-SCNC: 138 MMOL/L — SIGNIFICANT CHANGE UP (ref 135–145)
SP GR SPEC: >1.03 — HIGH (ref 1.01–1.02)
URATE SERPL-MCNC: 2.4 MG/DL — LOW (ref 2.5–7)
UROBILINOGEN FLD QL: 1
WBC # BLD: 7.3 K/UL — SIGNIFICANT CHANGE UP (ref 3.8–10.5)
WBC # FLD AUTO: 7.3 K/UL — SIGNIFICANT CHANGE UP (ref 3.8–10.5)

## 2017-11-20 RX ORDER — OXYTOCIN 10 UNIT/ML
333.33 VIAL (ML) INJECTION
Qty: 20 | Refills: 0 | Status: DISCONTINUED | OUTPATIENT
Start: 2017-11-20 | End: 2017-11-21

## 2017-11-20 RX ORDER — SODIUM CHLORIDE 9 MG/ML
1000 INJECTION, SOLUTION INTRAVENOUS
Qty: 0 | Refills: 0 | Status: DISCONTINUED | OUTPATIENT
Start: 2017-11-20 | End: 2017-11-21

## 2017-11-20 RX ORDER — CITRIC ACID/SODIUM CITRATE 300-500 MG
15 SOLUTION, ORAL ORAL EVERY 4 HOURS
Qty: 0 | Refills: 0 | Status: DISCONTINUED | OUTPATIENT
Start: 2017-11-20 | End: 2017-11-21

## 2017-11-20 RX ORDER — SODIUM CHLORIDE 9 MG/ML
500 INJECTION, SOLUTION INTRAVENOUS ONCE
Qty: 0 | Refills: 0 | Status: COMPLETED | OUTPATIENT
Start: 2017-11-20 | End: 2017-11-20

## 2017-11-20 RX ADMIN — SODIUM CHLORIDE 125 MILLILITER(S): 9 INJECTION, SOLUTION INTRAVENOUS at 21:36

## 2017-11-20 RX ADMIN — SODIUM CHLORIDE 1000 MILLILITER(S): 9 INJECTION, SOLUTION INTRAVENOUS at 21:15

## 2017-11-21 LAB
CREAT ?TM UR-MCNC: 208 MG/DL — SIGNIFICANT CHANGE UP
PROT ?TM UR-MCNC: 37 MG/DL — HIGH (ref 0–12)
PROT/CREAT UR-RTO: 0.2 RATIO — SIGNIFICANT CHANGE UP (ref 0–0.2)
T PALLIDUM AB TITR SER: NEGATIVE — SIGNIFICANT CHANGE UP

## 2017-11-21 PROCEDURE — 59409 OBSTETRICAL CARE: CPT | Mod: U9

## 2017-11-21 RX ORDER — SODIUM CHLORIDE 9 MG/ML
3 INJECTION INTRAMUSCULAR; INTRAVENOUS; SUBCUTANEOUS EVERY 8 HOURS
Qty: 0 | Refills: 0 | Status: DISCONTINUED | OUTPATIENT
Start: 2017-11-21 | End: 2017-11-23

## 2017-11-21 RX ORDER — DIPHENHYDRAMINE HCL 50 MG
25 CAPSULE ORAL EVERY 6 HOURS
Qty: 0 | Refills: 0 | Status: DISCONTINUED | OUTPATIENT
Start: 2017-11-21 | End: 2017-11-23

## 2017-11-21 RX ORDER — IBUPROFEN 200 MG
600 TABLET ORAL EVERY 6 HOURS
Qty: 0 | Refills: 0 | Status: DISCONTINUED | OUTPATIENT
Start: 2017-11-21 | End: 2017-11-23

## 2017-11-21 RX ORDER — ACETAMINOPHEN 500 MG
975 TABLET ORAL EVERY 6 HOURS
Qty: 0 | Refills: 0 | Status: DISCONTINUED | OUTPATIENT
Start: 2017-11-21 | End: 2017-11-23

## 2017-11-21 RX ORDER — MORPHINE SULFATE 50 MG/1
4 CAPSULE, EXTENDED RELEASE ORAL ONCE
Qty: 0 | Refills: 0 | Status: DISCONTINUED | OUTPATIENT
Start: 2017-11-21 | End: 2017-11-21

## 2017-11-21 RX ORDER — GLYCERIN ADULT
1 SUPPOSITORY, RECTAL RECTAL AT BEDTIME
Qty: 0 | Refills: 0 | Status: DISCONTINUED | OUTPATIENT
Start: 2017-11-21 | End: 2017-11-23

## 2017-11-21 RX ORDER — LANOLIN
1 OINTMENT (GRAM) TOPICAL EVERY 6 HOURS
Qty: 0 | Refills: 0 | Status: DISCONTINUED | OUTPATIENT
Start: 2017-11-21 | End: 2017-11-23

## 2017-11-21 RX ORDER — OXYTOCIN 10 UNIT/ML
41.67 VIAL (ML) INJECTION
Qty: 20 | Refills: 0 | Status: DISCONTINUED | OUTPATIENT
Start: 2017-11-21 | End: 2017-11-21

## 2017-11-21 RX ORDER — DIPHENHYDRAMINE HCL 50 MG
25 CAPSULE ORAL ONCE
Qty: 0 | Refills: 0 | Status: DISCONTINUED | OUTPATIENT
Start: 2017-11-21 | End: 2017-11-23

## 2017-11-21 RX ORDER — SIMETHICONE 80 MG/1
80 TABLET, CHEWABLE ORAL EVERY 6 HOURS
Qty: 0 | Refills: 0 | Status: DISCONTINUED | OUTPATIENT
Start: 2017-11-21 | End: 2017-11-23

## 2017-11-21 RX ORDER — IBUPROFEN 200 MG
600 TABLET ORAL EVERY 6 HOURS
Qty: 0 | Refills: 0 | Status: COMPLETED | OUTPATIENT
Start: 2017-11-21 | End: 2018-10-20

## 2017-11-21 RX ORDER — MAGNESIUM HYDROXIDE 400 MG/1
30 TABLET, CHEWABLE ORAL
Qty: 0 | Refills: 0 | Status: DISCONTINUED | OUTPATIENT
Start: 2017-11-21 | End: 2017-11-23

## 2017-11-21 RX ORDER — DIBUCAINE 1 %
1 OINTMENT (GRAM) RECTAL EVERY 4 HOURS
Qty: 0 | Refills: 0 | Status: DISCONTINUED | OUTPATIENT
Start: 2017-11-21 | End: 2017-11-23

## 2017-11-21 RX ORDER — OXYCODONE HYDROCHLORIDE 5 MG/1
5 TABLET ORAL EVERY 4 HOURS
Qty: 0 | Refills: 0 | Status: DISCONTINUED | OUTPATIENT
Start: 2017-11-21 | End: 2017-11-23

## 2017-11-21 RX ORDER — OXYTOCIN 10 UNIT/ML
41.67 VIAL (ML) INJECTION
Qty: 20 | Refills: 0 | Status: DISCONTINUED | OUTPATIENT
Start: 2017-11-21 | End: 2017-11-23

## 2017-11-21 RX ORDER — SODIUM CHLORIDE 9 MG/ML
3 INJECTION INTRAMUSCULAR; INTRAVENOUS; SUBCUTANEOUS EVERY 8 HOURS
Qty: 0 | Refills: 0 | Status: DISCONTINUED | OUTPATIENT
Start: 2017-11-21 | End: 2017-11-21

## 2017-11-21 RX ORDER — PRAMOXINE HYDROCHLORIDE 150 MG/15G
1 AEROSOL, FOAM RECTAL EVERY 4 HOURS
Qty: 0 | Refills: 0 | Status: DISCONTINUED | OUTPATIENT
Start: 2017-11-21 | End: 2017-11-23

## 2017-11-21 RX ORDER — KETOROLAC TROMETHAMINE 30 MG/ML
30 SYRINGE (ML) INJECTION ONCE
Qty: 0 | Refills: 0 | Status: DISCONTINUED | OUTPATIENT
Start: 2017-11-21 | End: 2017-11-21

## 2017-11-21 RX ORDER — TETANUS TOXOID, REDUCED DIPHTHERIA TOXOID AND ACELLULAR PERTUSSIS VACCINE, ADSORBED 5; 2.5; 8; 8; 2.5 [IU]/.5ML; [IU]/.5ML; UG/.5ML; UG/.5ML; UG/.5ML
0.5 SUSPENSION INTRAMUSCULAR ONCE
Qty: 0 | Refills: 0 | Status: DISCONTINUED | OUTPATIENT
Start: 2017-11-21 | End: 2017-11-23

## 2017-11-21 RX ORDER — OXYCODONE HYDROCHLORIDE 5 MG/1
5 TABLET ORAL
Qty: 0 | Refills: 0 | Status: DISCONTINUED | OUTPATIENT
Start: 2017-11-21 | End: 2017-11-23

## 2017-11-21 RX ORDER — AER TRAVELER 0.5 G/1
1 SOLUTION RECTAL; TOPICAL EVERY 4 HOURS
Qty: 0 | Refills: 0 | Status: DISCONTINUED | OUTPATIENT
Start: 2017-11-21 | End: 2017-11-23

## 2017-11-21 RX ORDER — AER TRAVELER 0.5 G/1
1 SOLUTION RECTAL; TOPICAL EVERY 4 HOURS
Qty: 0 | Refills: 0 | Status: DISCONTINUED | OUTPATIENT
Start: 2017-11-21 | End: 2017-11-21

## 2017-11-21 RX ORDER — OXYTOCIN 10 UNIT/ML
4 VIAL (ML) INJECTION
Qty: 30 | Refills: 0 | Status: DISCONTINUED | OUTPATIENT
Start: 2017-11-21 | End: 2017-11-21

## 2017-11-21 RX ORDER — DIBUCAINE 1 %
1 OINTMENT (GRAM) RECTAL EVERY 4 HOURS
Qty: 0 | Refills: 0 | Status: DISCONTINUED | OUTPATIENT
Start: 2017-11-21 | End: 2017-11-21

## 2017-11-21 RX ORDER — HYDROCORTISONE 1 %
1 OINTMENT (GRAM) TOPICAL EVERY 4 HOURS
Qty: 0 | Refills: 0 | Status: DISCONTINUED | OUTPATIENT
Start: 2017-11-21 | End: 2017-11-21

## 2017-11-21 RX ORDER — ACETAMINOPHEN 500 MG
975 TABLET ORAL EVERY 6 HOURS
Qty: 0 | Refills: 0 | Status: COMPLETED | OUTPATIENT
Start: 2017-11-21 | End: 2018-10-20

## 2017-11-21 RX ORDER — DOCUSATE SODIUM 100 MG
100 CAPSULE ORAL
Qty: 0 | Refills: 0 | Status: DISCONTINUED | OUTPATIENT
Start: 2017-11-21 | End: 2017-11-23

## 2017-11-21 RX ORDER — HYDROCORTISONE 1 %
1 OINTMENT (GRAM) TOPICAL EVERY 4 HOURS
Qty: 0 | Refills: 0 | Status: DISCONTINUED | OUTPATIENT
Start: 2017-11-21 | End: 2017-11-23

## 2017-11-21 RX ORDER — PRAMOXINE HYDROCHLORIDE 150 MG/15G
1 AEROSOL, FOAM RECTAL EVERY 4 HOURS
Qty: 0 | Refills: 0 | Status: DISCONTINUED | OUTPATIENT
Start: 2017-11-21 | End: 2017-11-21

## 2017-11-21 RX ADMIN — Medication 30 MILLIGRAM(S): at 20:30

## 2017-11-22 ENCOUNTER — TRANSCRIPTION ENCOUNTER (OUTPATIENT)
Age: 22
End: 2017-11-22

## 2017-11-22 VITALS
DIASTOLIC BLOOD PRESSURE: 75 MMHG | HEART RATE: 93 BPM | TEMPERATURE: 99 F | RESPIRATION RATE: 18 BRPM | SYSTOLIC BLOOD PRESSURE: 112 MMHG | OXYGEN SATURATION: 99 %

## 2017-11-22 LAB
HCT VFR BLD CALC: 32 % — LOW (ref 34.5–45)
HGB BLD-MCNC: 10.1 G/DL — LOW (ref 11.5–15.5)

## 2017-11-22 RX ADMIN — Medication 600 MILLIGRAM(S): at 22:50

## 2017-11-22 RX ADMIN — Medication 600 MILLIGRAM(S): at 12:00

## 2017-11-22 RX ADMIN — Medication 600 MILLIGRAM(S): at 11:03

## 2017-11-22 RX ADMIN — Medication 600 MILLIGRAM(S): at 16:36

## 2017-11-22 RX ADMIN — Medication 600 MILLIGRAM(S): at 17:10

## 2017-11-22 RX ADMIN — Medication 600 MILLIGRAM(S): at 22:18

## 2017-11-22 NOTE — DISCHARGE NOTE OB - PATIENT PORTAL LINK FT
“You can access the [employer] Patient Portal, offered by [facility], by registering with the following website: [email or web address]”

## 2017-11-22 NOTE — PROGRESS NOTE ADULT - PROBLEM SELECTOR PLAN 1
- Pain well controlled, continue current pain regimen  - Increase ambulation, SCDs when not ambulating      Marisela Bagley MD, PGY1

## 2017-11-22 NOTE — DISCHARGE NOTE OB - CARE PROVIDER_API CALL
[facility],   [address] [place] [address]. [address]  [facility] [address]  Phone: [phone]  Fax: (   )    -

## 2017-11-22 NOTE — DISCHARGE NOTE OB - MATERIALS PROVIDED
[facility] [place] Screening Program/Back To Sleep Handout/[facility] Hearing Screen Program/Breastfeeding Log/Breastfeeding Guide and Packet/Birth Certificate Instructions/Breastfeeding Mother’s Support Group Information/Guide to Postpartum Care

## 2017-11-22 NOTE — DISCHARGE NOTE OB - HOSPITAL COURSE
Patient had uncomplicated  of liveborn female infant on 17.  .  Postpartum course was uneventful.  On the day of discharge the patient was stable and afebrile, voiding spontaneously, ambulating at baseline, tolerating regular diet, and had pain well controlled by oral pain medications.  Patient to have close follow up with [facility] outpatient.

## 2017-11-22 NOTE — PROGRESS NOTE ADULT - SUBJECTIVE AND OBJECTIVE BOX
OB Progress Note:  PPD#1    S: 21yo   PPD#1 s/p . Patient feels well. Pain is well controlled. She is tolerating a regular diet and passing flatus. She is voiding spontaneously, and ambulating without difficulty. Denies CP/SOB. Denies lightheadedness/dizziness. Denies N/V.    O:  Vitals:  Vital Signs Last 24 Hrs  T(C): 36.9 (2017 05:15), Max: 37.3 (2017 19:35)  HR: 74 (2017 05:15) (74 - 126)  BP: 108/69 (2017 05:15) (100/68 - 141/73)  BP(mean): 89 (2017 19:05) (86 - 96)  RR: 18 (2017 05:15) (17 - 19)  SpO2: 96% (2017 19:35) (96% - 99%)    MEDICATIONS  (STANDING):  acetaminophen   Tablet. 975 milliGRAM(s) Oral every 6 hours  diphenhydrAMINE   Injectable 25 milliGRAM(s) IV Push once  diphtheria/tetanus/pertussis (acellular) Vaccine (ADAcel) 0.5 milliLiter(s) IntraMuscular once  ibuprofen  Tablet 600 milliGRAM(s) Oral every 6 hours  oxyCODONE    IR 5 milliGRAM(s) Oral every 3 hours  oxytocin Infusion 41.667 milliUNIT(s)/Min (125 mL/Hr) IV Continuous <Continuous>  prenatal multivitamin 1 Tablet(s) Oral daily  sodium chloride 0.9% lock flush 3 milliLiter(s) IV Push every 8 hours      Labs:  Blood type: B Positive  Rubella IgG: RPR: Negative                          10.7<L>   7.3 >-----------< 197    (  @ 21:29 )             32.0<L>    - @ 21:29      138  |  103  |  7   ----------------------------<  120<H>  3.4<L>   |  22  |  0.56        Ca    8.8      2017 21:29    TPro  6.5  /  Alb  3.3  /  TBili  0.1<L>  /  DBili  x   /  AST  15  /  ALT  10  /  AlkPhos  232<H>  11-20-17 @ 21:29    Physical Exam:  General: NAD  Abdomen: soft, non-tender, non-distended, fundus firm  Vaginal: Lochia wnl  Extremities: No erythema/edema

## 2017-11-22 NOTE — DISCHARGE NOTE OB - PROVIDER TOKENS
FREE:[LAST:[[facility]],PHONE:[phone]],FAX:[(   )    -],ADDRESS:[[address] [place] [address]. [place] [address]]]

## 2017-11-22 NOTE — DISCHARGE NOTE OB - MEDICATION SUMMARY - MEDICATIONS TO TAKE
I will START or STAY ON the medications listed below when I get home from the hospital:    acetaminophen 325 mg oral tablet  -- 3 tab(s) by mouth every 6 hours  -- Indication: For Pain    ibuprofen 600 mg oral tablet  -- 1 tab(s) by mouth every 6 hours  -- Indication: For Pain    Prenatal Multivitamins with Folic Acid 1 mg oral tablet  -- 1 tab(s) by mouth once a day  -- Indication: For Home med

## 2017-11-22 NOTE — DISCHARGE NOTE OB - PLAN OF CARE
Wellness 1. Please follow up with [facility] in 6 weeks for postpartum checkup.  Please call [phone] for your appointment.  2. Nothing in the vagina for 6 weeks including no tampons, no douching, no intercourse and no tub baths.  3. Please call the office or go to the ED if you have fever over 100.4F, pain not controlled by oral pain meds, vaginal bleeding more than 1 pads per hour for 2 hours, difficulty urinating, or for any other concerns.

## 2017-11-22 NOTE — DISCHARGE NOTE OB - CARE PLAN
Principal Discharge DX:	Vaginal delivery  Goal:	Wellness  Instructions for follow-up, activity and diet:	1. Please follow up with [facility] in 6 weeks for postpartum checkup.  Please call [phone] for your appointment.  2. Nothing in the vagina for 6 weeks including no tampons, no douching, no intercourse and no tub baths.  3. Please call the office or go to the ED if you have fever over 100.4F, pain not controlled by oral pain meds, vaginal bleeding more than 1 pads per hour for 2 hours, difficulty urinating, or for any other concerns.

## 2017-11-23 PROCEDURE — 83615 LACTATE (LD) (LDH) ENZYME: CPT

## 2017-11-23 PROCEDURE — 59050 FETAL MONITOR W/REPORT: CPT

## 2017-11-23 PROCEDURE — 85610 PROTHROMBIN TIME: CPT

## 2017-11-23 PROCEDURE — 86901 BLOOD TYPING SEROLOGIC RH(D): CPT

## 2017-11-23 PROCEDURE — 85018 HEMOGLOBIN: CPT

## 2017-11-23 PROCEDURE — 59025 FETAL NON-STRESS TEST: CPT

## 2017-11-23 PROCEDURE — 80053 COMPREHEN METABOLIC PANEL: CPT

## 2017-11-23 PROCEDURE — 86900 BLOOD TYPING SEROLOGIC ABO: CPT

## 2017-11-23 PROCEDURE — 86780 TREPONEMA PALLIDUM: CPT

## 2017-11-23 PROCEDURE — 84156 ASSAY OF PROTEIN URINE: CPT

## 2017-11-23 PROCEDURE — 85027 COMPLETE CBC AUTOMATED: CPT

## 2017-11-23 PROCEDURE — 86850 RBC ANTIBODY SCREEN: CPT

## 2017-11-23 PROCEDURE — 85384 FIBRINOGEN ACTIVITY: CPT

## 2017-11-23 PROCEDURE — 85730 THROMBOPLASTIN TIME PARTIAL: CPT

## 2017-11-23 PROCEDURE — 84550 ASSAY OF BLOOD/URIC ACID: CPT

## 2017-11-23 PROCEDURE — 81001 URINALYSIS AUTO W/SCOPE: CPT

## 2017-11-23 NOTE — PROGRESS NOTE ADULT - SUBJECTIVE AND OBJECTIVE BOX
Patient seen and examined at bedside, no acute overnight events. No acute complaints, pain well controlled. Patient is ambulating, voiding spontaneously, passing gas, and tolerating regular diet. Pt is breast feeding her baby.    Vital Signs Last 24 Hours  T(C): 37.2 (11-22-17 @ 18:34), Max: 37.2 (11-22-17 @ 18:34)  HR: 93 (11-22-17 @ 18:34) (93 - 93)  BP: 112/75 (11-22-17 @ 18:34) (112/75 - 112/75)  RR: 18 (11-22-17 @ 18:34) (18 - 18)  SpO2: 99% (11-22-17 @ 18:34) (99% - 99%)    Physical Exam:  General: NAD  Abdomen: Soft, non-tender, non-distended, fundus firm  Pelvic: Lochia wnl    Labs:    Blood Type: B Positive  Antibody Screen: Negative  RPR: Negative               10.1   x     )-----------( x        ( 11-22 @ 09:07 )             32.0                10.7   7.3   )-----------( 197      ( 11-20 @ 21:29 )             32.0         MEDICATIONS  (STANDING):  acetaminophen   Tablet. 975 milliGRAM(s) Oral every 6 hours  diphenhydrAMINE   Injectable 25 milliGRAM(s) IV Push once  diphtheria/tetanus/pertussis (acellular) Vaccine (ADAcel) 0.5 milliLiter(s) IntraMuscular once  ibuprofen  Tablet 600 milliGRAM(s) Oral every 6 hours  oxyCODONE    IR 5 milliGRAM(s) Oral every 3 hours  oxytocin Infusion 41.667 milliUNIT(s)/Min (125 mL/Hr) IV Continuous <Continuous>  prenatal multivitamin 1 Tablet(s) Oral daily  sodium chloride 0.9% lock flush 3 milliLiter(s) IV Push every 8 hours    MEDICATIONS  (PRN):  dibucaine 1% Ointment 1 Application(s) Topical every 4 hours PRN Perineal Discomfort  diphenhydrAMINE   Capsule 25 milliGRAM(s) Oral every 6 hours PRN Itching  docusate sodium 100 milliGRAM(s) Oral two times a day PRN Stool Softening  glycerin Suppository - Adult 1 Suppository(s) Rectal at bedtime PRN Constipation  hydrocortisone 1% Cream 1 Application(s) Topical every 4 hours PRN Moderate to Severe Perineal Pain  lanolin Ointment 1 Application(s) Topical every 6 hours PRN Sore Nipples  magnesium hydroxide Suspension 30 milliLiter(s) Oral two times a day PRN Constipation  oxyCODONE    IR 5 milliGRAM(s) Oral every 4 hours PRN Severe Pain (7 -10)  pramoxine 1%/zinc 5% Cream 1 Application(s) Topical every 4 hours PRN Moderate to Severe Perineal Pain  simethicone 80 milliGRAM(s) Chew every 6 hours PRN Gas  witch hazel Pads 1 Application(s) Topical every 4 hours PRN Perineal Discomfort

## 2017-11-23 NOTE — PROGRESS NOTE ADULT - PROBLEM SELECTOR PLAN 1
- Continue with po analgesia  - Increase ambulation  - Continue regular diet  - IV lock  - No labs  - Patient desires natural family planning for contraception  - Dispo: today    ISABEL Marinelli pgy1

## 2017-11-23 NOTE — PROGRESS NOTE ADULT - ATTENDING COMMENTS
Pt seen and evaluated.  Stable for D/C home PPD#2 s/p   F/U [facility] 4-6 wks for PP Check  Motrin for pain  Complete D/C instructions given

## 2017-12-02 ENCOUNTER — EMERGENCY (EMERGENCY)
Facility: HOSPITAL | Age: 22
LOS: 1 days | Discharge: HOME | End: 2017-12-02
Attending: EMERGENCY MEDICINE | Admitting: EMERGENCY MEDICINE
Payer: MEDICAID

## 2017-12-02 VITALS
HEART RATE: 93 BPM | SYSTOLIC BLOOD PRESSURE: 125 MMHG | OXYGEN SATURATION: 100 % | DIASTOLIC BLOOD PRESSURE: 87 MMHG | RESPIRATION RATE: 18 BRPM | TEMPERATURE: 99 F

## 2017-12-02 DIAGNOSIS — Z98.89 OTHER SPECIFIED POSTPROCEDURAL STATES: Chronic | ICD-10-CM

## 2017-12-02 LAB
ALBUMIN SERPL ELPH-MCNC: 3.8 G/DL — SIGNIFICANT CHANGE UP (ref 3.3–5)
ALP SERPL-CCNC: 164 U/L — HIGH (ref 40–120)
ALT FLD-CCNC: 18 U/L RC — SIGNIFICANT CHANGE UP (ref 10–45)
ANION GAP SERPL CALC-SCNC: 16 MMOL/L — SIGNIFICANT CHANGE UP (ref 5–17)
APTT BLD: 32.3 SEC — SIGNIFICANT CHANGE UP (ref 27.5–37.4)
AST SERPL-CCNC: 22 U/L — SIGNIFICANT CHANGE UP (ref 10–40)
BILIRUB SERPL-MCNC: 0.2 MG/DL — SIGNIFICANT CHANGE UP (ref 0.2–1.2)
BUN SERPL-MCNC: 10 MG/DL — SIGNIFICANT CHANGE UP (ref 7–23)
CALCIUM SERPL-MCNC: 9.9 MG/DL — SIGNIFICANT CHANGE UP (ref 8.4–10.5)
CHLORIDE SERPL-SCNC: 103 MMOL/L — SIGNIFICANT CHANGE UP (ref 96–108)
CO2 SERPL-SCNC: 22 MMOL/L — SIGNIFICANT CHANGE UP (ref 22–31)
CREAT SERPL-MCNC: 0.57 MG/DL — SIGNIFICANT CHANGE UP (ref 0.5–1.3)
GLUCOSE SERPL-MCNC: 77 MG/DL — SIGNIFICANT CHANGE UP (ref 70–99)
HCT VFR BLD CALC: 38.6 % — SIGNIFICANT CHANGE UP (ref 34.5–45)
HGB BLD-MCNC: 12.9 G/DL — SIGNIFICANT CHANGE UP (ref 11.5–15.5)
INR BLD: 1.04 RATIO — SIGNIFICANT CHANGE UP (ref 0.88–1.16)
MAGNESIUM SERPL-MCNC: 2 MG/DL — SIGNIFICANT CHANGE UP (ref 1.6–2.6)
MCHC RBC-ENTMCNC: 30.2 PG — SIGNIFICANT CHANGE UP (ref 27–34)
MCHC RBC-ENTMCNC: 33.5 GM/DL — SIGNIFICANT CHANGE UP (ref 32–36)
MCV RBC AUTO: 90.2 FL — SIGNIFICANT CHANGE UP (ref 80–100)
PHOSPHATE SERPL-MCNC: 3.4 MG/DL — SIGNIFICANT CHANGE UP (ref 2.5–4.5)
PLATELET # BLD AUTO: 258 K/UL — SIGNIFICANT CHANGE UP (ref 150–400)
POTASSIUM SERPL-MCNC: 4.2 MMOL/L — SIGNIFICANT CHANGE UP (ref 3.5–5.3)
POTASSIUM SERPL-SCNC: 4.2 MMOL/L — SIGNIFICANT CHANGE UP (ref 3.5–5.3)
PROT SERPL-MCNC: 7.4 G/DL — SIGNIFICANT CHANGE UP (ref 6–8.3)
PROTHROM AB SERPL-ACNC: 11.4 SEC — SIGNIFICANT CHANGE UP (ref 9.8–12.7)
RBC # BLD: 4.28 M/UL — SIGNIFICANT CHANGE UP (ref 3.8–5.2)
RBC # FLD: 12.9 % — SIGNIFICANT CHANGE UP (ref 10.3–14.5)
SODIUM SERPL-SCNC: 141 MMOL/L — SIGNIFICANT CHANGE UP (ref 135–145)
WBC # BLD: 9 K/UL — SIGNIFICANT CHANGE UP (ref 3.8–10.5)
WBC # FLD AUTO: 9 K/UL — SIGNIFICANT CHANGE UP (ref 3.8–10.5)

## 2017-12-02 PROCEDURE — 99285 EMERGENCY DEPT VISIT HI MDM: CPT

## 2017-12-02 NOTE — ED PROVIDER NOTE - PROGRESS NOTE DETAILS
Nathaniel Edward DO: Pt was evaluated at bedside by Ob and prelim US reviewed. Recommend Augmentin 500BID x 7 days and fu in ob clinic as they elicited tenderness on exam and if official report of TVUS negative for POC. Nathaniel Edward DO: discussed US results with ob attending. pt to fu in ob clinic.

## 2017-12-02 NOTE — ED ADULT NURSE NOTE - OBJECTIVE STATEMENT
22y female post partum 10 days presents to the ER c/o foul smelling and bloody discharge. pt is alert and oriented x 4 and speaking coherently. Pt states over the past few days she has noticed fou 22y female post partum 10 days presents to the ER c/o foul smelling and bloody discharge. pt is alert and oriented x 4 and speaking coherently. Pt states over the past few days she has noticed foul smelling discharge. pt delivered vaginally, induced due to hx of preeclampsia and decreased fetal growth. Pt states she has had a large amount of bloody discharge as well. Pt denies cp, sob, fevers, n/v/d. Pt in NAD- respirations equal and regular. VSS. MD dai completed- RN witnessed vaginal exam with MD. small amount of serosanguinous discharge noted from vagina. Pt 22y female post partum 10 days presents to the ER c/o foul smelling and bloody discharge. pt is alert and oriented x 4 and speaking coherently. Pt states over the past few days she has noticed foul smelling discharge. pt delivered vaginally, induced due to hx of preeclampsia and decreased fetal growth. Pt states she has had a large amount of bloody discharge as well. Pt denies cp, sob, fevers, n/v/d. Pt in NAD- respirations equal and regular. VSS. MD dai completed- RN witnessed vaginal exam with MD. small amount of serosanguinous discharge noted from vagina. Pt appears calm. safety maintained. will reassess.

## 2017-12-02 NOTE — ED PROVIDER NOTE - OBJECTIVE STATEMENT
23 yo  F with no pmhx 21 yo  F with no pmhx now presenting with vaginal bleeding 1.5 weeks post vaginal-delivery. Pt gave birth on 17 (Tuesday) without issue. Since this 17, pt had developed thick vaginal discharge which was bloody in nature, foul smelling and heavy (pt has been using 8-10 pads per day). Pt also has had chills, but no fever. She feels a heavy pressure in her pelvs every time she stands or goes to the bathroom, but does not feel any bulging or internal tissue protruding from her vagina.    She has only taken tylenol for pain. Pt is a gyn clinic patient. 21 yo  F with no pmhx now presenting with vaginal bleeding 1.5 weeks post vaginal-delivery. Pt gave birth on 17 (Tuesday) without issue. Since this 17, pt had developed thick vaginal discharge which was bloody in nature, foul smelling and heavy (pt has been using 8-10 pads per day). Pt also has had chills, but no fever. She feels a heavy pressure in her pelvis every time she stands or goes to the bathroom, but does not feel any bulging or internal tissue protruding from her vagina.    She has only taken tylenol for pain. Pt is a gyn clinic patient.

## 2017-12-02 NOTE — ED PROVIDER NOTE - ATTENDING CONTRIBUTION TO CARE
pt with heavy vaginal bleeding 10 days s/p . did notice foul smelling dc. no [id number] fever, but + chills.   on exam, well appearing. bright red blood in vaginal vault. os closed.   Vaginal bleeding postpartum - r/o retained POCs.   labs, TVUS. ob.

## 2017-12-03 VITALS
OXYGEN SATURATION: 99 % | TEMPERATURE: 99 F | DIASTOLIC BLOOD PRESSURE: 69 MMHG | HEART RATE: 80 BPM | RESPIRATION RATE: 18 BRPM | SYSTOLIC BLOOD PRESSURE: 113 MMHG

## 2017-12-03 DIAGNOSIS — N71.9 INFLAMMATORY DISEASE OF UTERUS, UNSPECIFIED: ICD-10-CM

## 2017-12-03 PROCEDURE — 85610 PROTHROMBIN TIME: CPT

## 2017-12-03 PROCEDURE — 84100 ASSAY OF PHOSPHORUS: CPT

## 2017-12-03 PROCEDURE — 85730 THROMBOPLASTIN TIME PARTIAL: CPT

## 2017-12-03 PROCEDURE — 76856 US EXAM PELVIC COMPLETE: CPT

## 2017-12-03 PROCEDURE — 80053 COMPREHEN METABOLIC PANEL: CPT

## 2017-12-03 PROCEDURE — 76830 TRANSVAGINAL US NON-OB: CPT

## 2017-12-03 PROCEDURE — 76856 US EXAM PELVIC COMPLETE: CPT | Mod: 26

## 2017-12-03 PROCEDURE — 85027 COMPLETE CBC AUTOMATED: CPT

## 2017-12-03 PROCEDURE — 76830 TRANSVAGINAL US NON-OB: CPT | Mod: 26

## 2017-12-03 PROCEDURE — 99284 EMERGENCY DEPT VISIT MOD MDM: CPT | Mod: 25

## 2017-12-03 PROCEDURE — 83735 ASSAY OF MAGNESIUM: CPT

## 2017-12-03 RX ADMIN — Medication 1 TABLET(S): at 01:05

## 2017-12-03 NOTE — CONSULT NOTE ADULT - ASSESSMENT
22y  presents PPD#11 complaining of heavy vaginal bleeding.  Normal labs with no TVUS evidence of retained products.  Tender on exam with chills c/w endometritis

## 2017-12-03 NOTE — CONSULT NOTE ADULT - PROBLEM SELECTOR RECOMMENDATION 9
-Augmentin 500 BID x 7 d  -f/u this week in clinic or for heavy bleeding saturating 1 pad/hour for >2 hours, fevers, and chills    dw Dr Eladia Scott PGY2 -Augmentin 500 BID x 7 d for possible endometritis  -f/u this week in clinic or for heavy bleeding saturating 1 pad/hour for >2 hours, fevers, and chills    dw Dr Eladia Scott PGY2

## 2017-12-03 NOTE — CONSULT NOTE ADULT - SUBJECTIVE AND OBJECTIVE BOX
22y  presents PPD#11 complaining of heavy vaginal bleeding.  Patient had normal lochia until 2 days ago when bleeding became heavy, requiring 8 pads a day.  It was associated with passing clots and malodorous.  Patient complains of associated nausea, lightheadedness, and chills.  She denies fevers.  She is breastfeeding and denies breast pain.  No CP, SOB.        OB/GYN HISTORY: 2016  c/b sPEC and GDMA1, this PNC complicated by cholestasis  PAST MEDICAL & SURGICAL HISTORY:    History of appendectomy, cholecystectomy    Allergies    No Known Allergies    Intolerances      MEDICATIONS  (STANDING): PNV    MEDICATIONS  (PRN):    FAMILY HISTORY:  No pertinent family history in first degree relatives    SOCIAL HISTORY:       Vital Signs Last 24 Hrs  T(C): 36.6 (02 Dec 2017 20:55), Max: 37.1 (02 Dec 2017 20:01)  T(F): 97.8 (02 Dec 2017 20:55), Max: 98.8 (02 Dec 2017 20:01)  HR: 90 (02 Dec 2017 20:55) (90 - 93)  BP: 110/79 (02 Dec 2017 20:55) (110/79 - 125/87)  BP(mean): --  RR: 17 (02 Dec 2017 20:55) (17 - 18)  SpO2: 98% (02 Dec 2017 20:55) (98% - 100%)    PHYSICAL EXAM:      Constitutional: alert and oriented x 3    Breasts: no tenderness, no nodules    Respiratory: clear    Cardiovascular: regular rate and rhythm    Gastrointestinal: soft, non tender, + bowel sounds. No hepatosplenomegaly, no palpable masses    Genitourinary: NEFG  Cervix: closed/ long, no CMT  Uterus: normal size, non tender  Adnexa: non tender, no palpable masses    Rectal:     Extremities:    Neurological:    Skin:    Lymph Nodes:        LABS:                        12.9   9.0   )-----------( 258      ( 02 Dec 2017 21:02 )             38.6     12-    141  |  103  |  10  ----------------------------<  77  4.2   |  22  |  0.57    Ca    9.9      02 Dec 2017 21:02  Phos  3.4     12-  Mg     2.0     12-    TPro  7.4  /  Alb  3.8  /  TBili  0.2  /  DBili  x   /  AST  22  /  ALT  18  /  AlkPhos  164<H>  12-02    PT/INR - ( 02 Dec 2017 21:02 )   PT: 11.4 sec;   INR: 1.04 ratio         PTT - ( 02 Dec 2017 21:02 )  PTT:32.3 sec      Blood Type: B Positive      RADIOLOGY & ADDITIONAL STUDIES: 22y  presents PPD#11 complaining of heavy vaginal bleeding.  Patient had normal lochia until 2 days ago when bleeding became heavy, requiring 8 pads a day.  It was associated with passing clots and malodorous.  Patient complains of associated nausea, lightheadedness, and chills.  She denies fevers.  She is breastfeeding and denies breast pain.  No CP, SOB.        OB/GYN HISTORY: 2016  c/b sPEC and GDMA1, this PNC complicated by cholestasis  PAST MEDICAL & SURGICAL HISTORY:    History of appendectomy, cholecystectomy    Allergies    No Known Allergies    Intolerances      MEDICATIONS  (STANDING): PNV    MEDICATIONS  (PRN):    FAMILY HISTORY:  No pertinent family history in first degree relatives    SOCIAL HISTORY:       Vital Signs Last 24 Hrs  T(C): 36.6 (02 Dec 2017 20:55), Max: 37.1 (02 Dec 2017 20:01)  T(F): 97.8 (02 Dec 2017 20:55), Max: 98.8 (02 Dec 2017 20:01)  HR: 90 (02 Dec 2017 20:55) (90 - 93)  BP: 110/79 (02 Dec 2017 20:55) (110/79 - 125/87)  BP(mean): --  RR: 17 (02 Dec 2017 20:55) (17 - 18)  SpO2: 98% (02 Dec 2017 20:55) (98% - 100%)    PHYSICAL EXAM:      Constitutional: alert and oriented x 3    Respiratory: clear    Cardiovascular: regular rate and rhythm    Gastrointestinal: soft, moderate fundal tenderness, + bowel sounds. No hepatosplenomegaly, no palpable masses    Genitourinary: NEFG  Cervix: closed/ long, no CMT, no blood noted  Uterus: normal size, moderate fundal tenderness  Adnexa: non tender, no palpable masses      LABS:                        12.9   9.0   )-----------( 258      ( 02 Dec 2017 21:02 )             38.6     12-02    141  |  103  |  10  ----------------------------<  77  4.2   |  22  |  0.57    Ca    9.9      02 Dec 2017 21:02  Phos  3.4     12-02  Mg     2.0     12-02    TPro  7.4  /  Alb  3.8  /  TBili  0.2  /  DBili  x   /  AST  22  /  ALT  18  /  AlkPhos  164<H>  12-02    PT/INR - ( 02 Dec 2017 21:02 )   PT: 11.4 sec;   INR: 1.04 ratio         PTT - ( 02 Dec 2017 21:02 )  PTT:32.3 sec      Blood Type: B Positive      RADIOLOGY & ADDITIONAL STUDIES:    EXAM:  US TRANSVAGINAL                          EXAM:  US PELVIC COMPLETE                            PROCEDURE DATE:  2017            INTERPRETATION:  CLINICAL INFORMATION: Heavy vaginal bleeding x3 days. 10   days postpartum. Please evaluate for retained products of conception.    LMP: Postpartum 2017.    COMPARISON: Pelvic ultrasound dated 2015.    TECHNIQUE:     Transabdominal and Endovaginal pelvic sonogram. Color and Spectral   Doppler was performed.      FINDINGS:    Uterus: 9.1 x 7.8 x 8.0 cm. Within normal limits.    Endometrium: 9 mm. Heterogeneous echogenicity containing a small amount   of fluid/debris . No discrete vascularity is identified utilizing color   Doppler.    Right ovary: 2.2 x 1.4 x 1.4 cm. Within normal limits. Ovarian blood flow   is demonstrated utilizing color and spectral Doppler.    Left ovary: 2.3 x 1.3 x 1.8 cm. Within normal limits. Ovarian blood flow   is demonstrated utilizing color and spectral Doppler.    Fluid: None.    IMPRESSION:    Somewhat heterogeneous endometrium with a small amount of complex free   fluid/debris.    No definite internal vascularity identified to suggest retained products   of conception.                VIOLETTE BARRIOS M.D., RADIOLOGY RESIDENT  This document has been electronically signed.  KAROLINE FAUSTIN M.D., ATTENDING RADIOLOGIST  This document has been electronically signed. Dec  3 2017 12:59AM

## 2017-12-12 ENCOUNTER — OUTPATIENT (OUTPATIENT)
Dept: OUTPATIENT SERVICES | Facility: HOSPITAL | Age: 22
LOS: 1 days | End: 2017-12-12
Payer: MEDICAID

## 2017-12-12 ENCOUNTER — APPOINTMENT (OUTPATIENT)
Dept: OBGYN | Facility: CLINIC | Age: 22
End: 2017-12-12
Payer: MEDICAID

## 2017-12-12 VITALS
HEIGHT: 61 IN | DIASTOLIC BLOOD PRESSURE: 72 MMHG | SYSTOLIC BLOOD PRESSURE: 130 MMHG | WEIGHT: 138 LBS | BODY MASS INDEX: 26.06 KG/M2

## 2017-12-12 DIAGNOSIS — Z34.92 ENCOUNTER FOR SUPERVISION OF NORMAL PREGNANCY, UNSPECIFIED, SECOND TRIMESTER: ICD-10-CM

## 2017-12-12 DIAGNOSIS — Z87.59 PERSONAL HISTORY OF OTHER COMPLICATIONS OF PREGNANCY, CHILDBIRTH AND THE PUERPERIUM: ICD-10-CM

## 2017-12-12 DIAGNOSIS — Z98.89 OTHER SPECIFIED POSTPROCEDURAL STATES: Chronic | ICD-10-CM

## 2017-12-12 DIAGNOSIS — Z34.90 ENCOUNTER FOR SUPERVISION OF NORMAL PREGNANCY, UNSPECIFIED, UNSPECIFIED TRIMESTER: ICD-10-CM

## 2017-12-12 PROCEDURE — 99213 OFFICE O/P EST LOW 20 MIN: CPT | Mod: NC

## 2017-12-12 PROCEDURE — G0463: CPT

## 2017-12-21 DIAGNOSIS — K64.9 UNSPECIFIED HEMORRHOIDS: ICD-10-CM

## 2018-01-09 ENCOUNTER — LABORATORY RESULT (OUTPATIENT)
Age: 23
End: 2018-01-09

## 2018-01-09 ENCOUNTER — APPOINTMENT (OUTPATIENT)
Dept: OBGYN | Facility: CLINIC | Age: 23
End: 2018-01-09
Payer: COMMERCIAL

## 2018-01-09 ENCOUNTER — OUTPATIENT (OUTPATIENT)
Dept: OUTPATIENT SERVICES | Facility: HOSPITAL | Age: 23
LOS: 1 days | End: 2018-01-09
Payer: MEDICAID

## 2018-01-09 VITALS — SYSTOLIC BLOOD PRESSURE: 122 MMHG | WEIGHT: 135 LBS | DIASTOLIC BLOOD PRESSURE: 78 MMHG

## 2018-01-09 DIAGNOSIS — Z98.89 OTHER SPECIFIED POSTPROCEDURAL STATES: Chronic | ICD-10-CM

## 2018-01-09 DIAGNOSIS — J01.90 ACUTE SINUSITIS, UNSPECIFIED: ICD-10-CM

## 2018-01-09 DIAGNOSIS — R11.2 NAUSEA WITH VOMITING, UNSPECIFIED: ICD-10-CM

## 2018-01-09 DIAGNOSIS — G43.909 MIGRAINE, UNSPECIFIED, NOT INTRACTABLE, W/OUT STATUS MIGRAINOSUS: ICD-10-CM

## 2018-01-09 DIAGNOSIS — Z00.00 ENCOUNTER FOR GENERAL ADULT MEDICAL EXAMINATION W/OUT ABNORMAL FINDINGS: ICD-10-CM

## 2018-01-09 DIAGNOSIS — K64.9 UNSPECIFIED HEMORRHOIDS: ICD-10-CM

## 2018-01-09 DIAGNOSIS — R12 HEARTBURN: ICD-10-CM

## 2018-01-09 PROCEDURE — G0463: CPT

## 2018-01-09 PROCEDURE — 99214 OFFICE O/P EST MOD 30 MIN: CPT | Mod: NC

## 2018-01-10 LAB
C TRACH RRNA SPEC QL NAA+PROBE: SIGNIFICANT CHANGE UP
N GONORRHOEA RRNA SPEC QL NAA+PROBE: SIGNIFICANT CHANGE UP
SPECIMEN SOURCE: SIGNIFICANT CHANGE UP

## 2018-01-11 LAB — CYTOLOGY SPEC DOC CYTO: SIGNIFICANT CHANGE UP

## 2018-01-12 DIAGNOSIS — R12 HEARTBURN: ICD-10-CM

## 2018-01-12 DIAGNOSIS — K64.9 UNSPECIFIED HEMORRHOIDS: ICD-10-CM

## 2018-01-12 DIAGNOSIS — G43.909 MIGRAINE, UNSPECIFIED, NOT INTRACTABLE, WITHOUT STATUS MIGRAINOSUS: ICD-10-CM

## 2018-01-12 DIAGNOSIS — J01.90 ACUTE SINUSITIS, UNSPECIFIED: ICD-10-CM

## 2019-03-18 ENCOUNTER — OUTPATIENT (OUTPATIENT)
Dept: OUTPATIENT SERVICES | Facility: HOSPITAL | Age: 24
LOS: 1 days | End: 2019-03-18
Payer: MEDICAID

## 2019-03-18 ENCOUNTER — APPOINTMENT (OUTPATIENT)
Dept: OBGYN | Facility: CLINIC | Age: 24
End: 2019-03-18
Payer: MEDICAID

## 2019-03-18 ENCOUNTER — RESULT CHARGE (OUTPATIENT)
Age: 24
End: 2019-03-18

## 2019-03-18 VITALS — DIASTOLIC BLOOD PRESSURE: 88 MMHG | SYSTOLIC BLOOD PRESSURE: 122 MMHG | WEIGHT: 148.5 LBS

## 2019-03-18 DIAGNOSIS — Z80.41 FAMILY HISTORY OF MALIGNANT NEOPLASM OF OVARY: ICD-10-CM

## 2019-03-18 DIAGNOSIS — Z98.89 OTHER SPECIFIED POSTPROCEDURAL STATES: Chronic | ICD-10-CM

## 2019-03-18 DIAGNOSIS — N63.0 UNSPECIFIED LUMP IN UNSPECIFIED BREAST: ICD-10-CM

## 2019-03-18 DIAGNOSIS — N76.0 ACUTE VAGINITIS: ICD-10-CM

## 2019-03-18 PROCEDURE — G0463: CPT

## 2019-03-18 PROCEDURE — 99213 OFFICE O/P EST LOW 20 MIN: CPT

## 2019-03-18 NOTE — HISTORY OF PRESENT ILLNESS
[Definite:  ___ (Date)] : the last menstrual period was [unfilled] [Sexually Active] : is sexually active [Monogamous] : is monogamous

## 2019-03-21 LAB
HCG UR QL: NEGATIVE
QUALITY CONTROL: YES

## 2019-03-21 NOTE — REVIEW OF SYSTEMS
[Breast Lump] : breast lump [Fever] : no fever [Chills] : no chills [Abdominal Pain] : no abdominal pain [Pelvic Pain] : no pelvic pain [Breast Pain] : no breast pain [Headache] : no headache

## 2019-03-21 NOTE — PHYSICAL EXAM
[Awake] : awake [Alert] : alert [Oriented x3] : oriented to person, place, and time [Acute Distress] : no acute distress [Mass] : no breast mass [Nipple Discharge] : no nipple discharge [Axillary LAD] : no axillary lymphadenopathy [Soft] : not soft [Tender] : non tender [Distended] : not distended

## 2019-04-23 PROBLEM — Z00.00 ENCOUNTER FOR PREVENTIVE HEALTH EXAMINATION: Noted: 2019-04-23

## 2019-04-25 ENCOUNTER — ASOB RESULT (OUTPATIENT)
Age: 24
End: 2019-04-25

## 2019-04-25 ENCOUNTER — APPOINTMENT (OUTPATIENT)
Dept: OBGYN | Facility: CLINIC | Age: 24
End: 2019-04-25
Payer: MEDICAID

## 2019-04-25 VITALS
BODY MASS INDEX: 28.13 KG/M2 | SYSTOLIC BLOOD PRESSURE: 109 MMHG | WEIGHT: 149 LBS | DIASTOLIC BLOOD PRESSURE: 74 MMHG | HEIGHT: 61 IN | HEART RATE: 83 BPM

## 2019-04-25 DIAGNOSIS — Z34.91 ENCOUNTER FOR SUPERVISION OF NORMAL PREGNANCY, UNSPECIFIED, FIRST TRIMESTER: ICD-10-CM

## 2019-04-25 DIAGNOSIS — Z80.41 FAMILY HISTORY OF MALIGNANT NEOPLASM OF OVARY: ICD-10-CM

## 2019-04-25 PROCEDURE — 99385 PREV VISIT NEW AGE 18-39: CPT

## 2019-04-25 PROCEDURE — 76817 TRANSVAGINAL US OBSTETRIC: CPT

## 2019-04-25 PROCEDURE — 99213 OFFICE O/P EST LOW 20 MIN: CPT

## 2019-04-25 NOTE — PROCEDURE
[Cervical Pap Smear] : cervical Pap smear [Liquid Base] : liquid base [Affirm (Triple Culture)] : Affirm (triple culture) [GC Chlamydia Culture] : GC Chlamydia Culture [Tolerated Well] : the patient tolerated the procedure well [No Complications] : there were no complications

## 2019-04-25 NOTE — OB HISTORY
[Pregnancy History] : girl [___] : Delivery occurred at [unfilled] [facility]] : [facility]

## 2019-04-25 NOTE — HISTORY OF PRESENT ILLNESS
[___ Year(s) Ago] : [unfilled] year(s) ago [Unsure Pap Smear] : uncertain timing of her last Papanicolaou cytology [No Previous Mammogram] : no previous mammogram [Definite:  ___ (Date)] : the last menstrual period was [unfilled] [No Previous CRC Screening] : no previous screening

## 2019-04-25 NOTE — COUNSELING
[Breast Self Exam] : breast self exam [Nutrition] : nutrition [Exercise] : exercise [Vitamins/Supplements] : vitamins/supplements [Pregnancy Options] : pregnancy options [STD (testing, results, tx)] : STD (testing, results, tx)

## 2019-04-25 NOTE — PHYSICAL EXAM
[Awake] : awake [Alert] : alert [Acute Distress] : no acute distress [Mass] : no breast mass [Nipple Discharge] : no nipple discharge [Axillary LAD] : no axillary lymphadenopathy [Tender] : non tender [Soft] : soft [Oriented x3] : oriented to person, place, and time [Normal] : cervix [No Bleeding] : there was no active vaginal bleeding [Discharge] : had a ~M discharge [White] : white [Thick] : thick [Uterine Adnexae] : were not tender and not enlarged

## 2019-04-26 LAB
ABO + RH PNL BLD: NORMAL
BASOPHILS # BLD AUTO: 0.04 K/UL
BASOPHILS NFR BLD AUTO: 0.5 %
BLD GP AB SCN SERPL QL: NORMAL
CMV IGG SERPL QL: 4.6 U/ML
CMV IGG SERPL-IMP: POSITIVE
CMV IGM SERPL QL: <8 AU/ML
CMV IGM SERPL QL: NEGATIVE
EOSINOPHIL # BLD AUTO: 0.08 K/UL
EOSINOPHIL NFR BLD AUTO: 1 %
ESTIMATED AVERAGE GLUCOSE: 105 MG/DL
HBA1C MFR BLD HPLC: 5.3 %
HBV SURFACE AG SER QL: NONREACTIVE
HCT VFR BLD CALC: 39.7 %
HCV AB SER QL: NONREACTIVE
HCV S/CO RATIO: 0.17 S/CO
HGB BLD-MCNC: 12.7 G/DL
HIV1+2 AB SPEC QL IA.RAPID: NONREACTIVE
IMM GRANULOCYTES NFR BLD AUTO: 0.1 %
LYMPHOCYTES # BLD AUTO: 1.64 K/UL
LYMPHOCYTES NFR BLD AUTO: 21.3 %
MAN DIFF?: NORMAL
MCHC RBC-ENTMCNC: 29.1 PG
MCHC RBC-ENTMCNC: 32 GM/DL
MCV RBC AUTO: 90.8 FL
MONOCYTES # BLD AUTO: 0.59 K/UL
MONOCYTES NFR BLD AUTO: 7.7 %
NEUTROPHILS # BLD AUTO: 5.33 K/UL
NEUTROPHILS NFR BLD AUTO: 69.4 %
PLATELET # BLD AUTO: 227 K/UL
RBC # BLD: 4.37 M/UL
RBC # FLD: 12.9 %
RUBV IGG FLD-ACNC: 11.2 INDEX
RUBV IGG SER-IMP: POSITIVE
T PALLIDUM AB SER QL IA: NEGATIVE
TSH SERPL-ACNC: 0.61 UIU/ML
VZV AB TITR SER: POSITIVE
VZV IGG SER IF-ACNC: 1649 INDEX
WBC # FLD AUTO: 7.69 K/UL

## 2019-04-27 LAB
BACTERIA UR CULT: NORMAL
C TRACH RRNA SPEC QL NAA+PROBE: NOT DETECTED
CANDIDA VAG CYTO: NOT DETECTED
G VAGINALIS+PREV SP MTYP VAG QL MICRO: NOT DETECTED
LEAD BLD-MCNC: <1 UG/DL
N GONORRHOEA RRNA SPEC QL NAA+PROBE: NOT DETECTED
SOURCE AMPLIFICATION: NORMAL
T VAGINALIS VAG QL WET PREP: NOT DETECTED

## 2019-04-29 LAB
B19V IGG SER QL IA: 5.7 INDEX
B19V IGG+IGM SER-IMP: NORMAL
B19V IGG+IGM SER-IMP: POSITIVE
B19V IGM FLD-ACNC: 0.2 INDEX
B19V IGM SER-ACNC: NEGATIVE
CYTOLOGY CVX/VAG DOC THIN PREP: NORMAL
HGB A MFR BLD: 44.5 %
HGB A2 MFR BLD: 2.5 %
HGB F MFR BLD: 0.4 %
HGB FRACT BLD-IMP: NORMAL
HGB OTHER MFR BLD ELPH: 52.6 %
HGB S BLD QL SOLY: NEGATIVE

## 2019-05-01 ENCOUNTER — OTHER (OUTPATIENT)
Age: 24
End: 2019-05-01

## 2019-05-01 LAB — FMR1 GENE MUT ANL BLD/T: NORMAL

## 2019-05-02 ENCOUNTER — OTHER (OUTPATIENT)
Age: 24
End: 2019-05-02

## 2019-05-02 LAB
AR GENE MUT ANL BLD/T: NEGATIVE
CFTR MUT TESTED BLD/T: NEGATIVE

## 2019-05-16 ENCOUNTER — APPOINTMENT (OUTPATIENT)
Dept: OBGYN | Facility: CLINIC | Age: 24
End: 2019-05-16

## 2019-05-22 ENCOUNTER — APPOINTMENT (OUTPATIENT)
Dept: OBGYN | Facility: CLINIC | Age: 24
End: 2019-05-22

## 2019-05-23 LAB
MEV IGG FLD QL IA: >300 AU/ML
MEV IGG+IGM SER-IMP: POSITIVE

## 2019-05-24 LAB — MEV IGM SER QL: NEGATIVE

## 2019-05-31 ENCOUNTER — LABORATORY RESULT (OUTPATIENT)
Age: 24
End: 2019-05-31

## 2019-05-31 ENCOUNTER — ASOB RESULT (OUTPATIENT)
Age: 24
End: 2019-05-31

## 2019-05-31 ENCOUNTER — APPOINTMENT (OUTPATIENT)
Dept: ANTEPARTUM | Facility: CLINIC | Age: 24
End: 2019-05-31
Payer: MEDICAID

## 2019-05-31 PROCEDURE — 36416 COLLJ CAPILLARY BLOOD SPEC: CPT

## 2019-05-31 PROCEDURE — 76813 OB US NUCHAL MEAS 1 GEST: CPT

## 2019-05-31 PROCEDURE — 76801 OB US < 14 WKS SINGLE FETUS: CPT

## 2019-06-03 ENCOUNTER — APPOINTMENT (OUTPATIENT)
Dept: OBGYN | Facility: CLINIC | Age: 24
End: 2019-06-03
Payer: MEDICAID

## 2019-06-03 VITALS
WEIGHT: 145.5 LBS | BODY MASS INDEX: 27.47 KG/M2 | SYSTOLIC BLOOD PRESSURE: 108 MMHG | DIASTOLIC BLOOD PRESSURE: 74 MMHG | HEIGHT: 61 IN

## 2019-06-03 PROCEDURE — 0502F SUBSEQUENT PRENATAL CARE: CPT

## 2019-06-03 RX ORDER — DOCUSATE SODIUM 100 MG/1
100 CAPSULE ORAL 3 TIMES DAILY
Qty: 30 | Refills: 1 | Status: COMPLETED | COMMUNITY
Start: 2017-12-12 | End: 2019-06-03

## 2019-06-03 RX ORDER — HYDROCORTISONE 25 MG/G
2.5 CREAM TOPICAL
Qty: 30 | Refills: 3 | Status: COMPLETED | COMMUNITY
Start: 2017-12-12 | End: 2019-06-03

## 2019-06-03 RX ORDER — ONDANSETRON 4 MG/1
4 TABLET, ORALLY DISINTEGRATING ORAL
Qty: 4 | Refills: 0 | Status: COMPLETED | COMMUNITY
Start: 2019-04-29 | End: 2019-06-03

## 2019-06-03 RX ORDER — DOXYLAMINE SUCCINATE 25 MG
50 TABLET ORAL
Qty: 1 | Refills: 0 | Status: COMPLETED | COMMUNITY
Start: 2019-04-29 | End: 2019-06-03

## 2019-06-03 RX ORDER — PYRIDOXINE HCL (VITAMIN B6) 25 MG
25 TABLET ORAL
Qty: 90 | Refills: 0 | Status: COMPLETED | COMMUNITY
Start: 2019-04-29 | End: 2019-06-03

## 2019-06-03 RX ORDER — AZITHROMYCIN 250 MG/1
250 TABLET, FILM COATED ORAL
Qty: 1 | Refills: 0 | Status: COMPLETED | COMMUNITY
Start: 2017-06-16 | End: 2019-06-03

## 2019-06-04 ENCOUNTER — OTHER (OUTPATIENT)
Age: 24
End: 2019-06-04

## 2019-06-04 DIAGNOSIS — O99.810 ABNORMAL GLUCOSE COMPLICATING PREGNANCY: ICD-10-CM

## 2019-06-05 LAB — GLUCOSE 1H P 50 G GLC PO SERPL-MCNC: 150 MG/DL

## 2019-06-06 ENCOUNTER — APPOINTMENT (OUTPATIENT)
Dept: OBGYN | Facility: CLINIC | Age: 24
End: 2019-06-06

## 2019-06-07 ENCOUNTER — NON-APPOINTMENT (OUTPATIENT)
Age: 24
End: 2019-06-07

## 2019-07-01 ENCOUNTER — NON-APPOINTMENT (OUTPATIENT)
Age: 24
End: 2019-07-01

## 2019-07-01 ENCOUNTER — LABORATORY RESULT (OUTPATIENT)
Age: 24
End: 2019-07-01

## 2019-07-01 ENCOUNTER — APPOINTMENT (OUTPATIENT)
Dept: OBGYN | Facility: CLINIC | Age: 24
End: 2019-07-01
Payer: MEDICAID

## 2019-07-01 VITALS
HEIGHT: 61 IN | DIASTOLIC BLOOD PRESSURE: 72 MMHG | WEIGHT: 151.44 LBS | BODY MASS INDEX: 28.59 KG/M2 | SYSTOLIC BLOOD PRESSURE: 108 MMHG

## 2019-07-01 DIAGNOSIS — Z34.02 ENCOUNTER FOR SUPERVISION OF NORMAL FIRST PREGNANCY, SECOND TRIMESTER: ICD-10-CM

## 2019-07-01 PROCEDURE — 0502F SUBSEQUENT PRENATAL CARE: CPT

## 2019-07-02 LAB
ALBUMIN SERPL ELPH-MCNC: 3.9 G/DL
ALP BLD-CCNC: 86 U/L
ALT SERPL-CCNC: 14 U/L
ANION GAP SERPL CALC-SCNC: 15 MMOL/L
AST SERPL-CCNC: 16 U/L
BILIRUB SERPL-MCNC: <0.2 MG/DL
BUN SERPL-MCNC: 9 MG/DL
CALCIUM SERPL-MCNC: 9.6 MG/DL
CHLORIDE SERPL-SCNC: 101 MMOL/L
CO2 SERPL-SCNC: 21 MMOL/L
CREAT SERPL-MCNC: 0.46 MG/DL
GLUCOSE SERPL-MCNC: 78 MG/DL
POTASSIUM SERPL-SCNC: 3.7 MMOL/L
PROT SERPL-MCNC: 6.5 G/DL
SODIUM SERPL-SCNC: 137 MMOL/L

## 2019-07-09 ENCOUNTER — MEDICATION RENEWAL (OUTPATIENT)
Age: 24
End: 2019-07-09

## 2019-07-29 ENCOUNTER — NON-APPOINTMENT (OUTPATIENT)
Age: 24
End: 2019-07-29

## 2019-07-29 ENCOUNTER — APPOINTMENT (OUTPATIENT)
Dept: OBGYN | Facility: CLINIC | Age: 24
End: 2019-07-29
Payer: MEDICAID

## 2019-07-29 ENCOUNTER — ASOB RESULT (OUTPATIENT)
Age: 24
End: 2019-07-29

## 2019-07-29 ENCOUNTER — APPOINTMENT (OUTPATIENT)
Dept: ANTEPARTUM | Facility: CLINIC | Age: 24
End: 2019-07-29
Payer: MEDICAID

## 2019-07-29 VITALS
HEIGHT: 61 IN | BODY MASS INDEX: 28.33 KG/M2 | DIASTOLIC BLOOD PRESSURE: 74 MMHG | SYSTOLIC BLOOD PRESSURE: 106 MMHG | WEIGHT: 150.06 LBS

## 2019-07-29 PROCEDURE — 0502F SUBSEQUENT PRENATAL CARE: CPT

## 2019-07-29 PROCEDURE — 76811 OB US DETAILED SNGL FETUS: CPT

## 2019-08-26 ENCOUNTER — NON-APPOINTMENT (OUTPATIENT)
Age: 24
End: 2019-08-26

## 2019-08-26 ENCOUNTER — APPOINTMENT (OUTPATIENT)
Dept: OBGYN | Facility: CLINIC | Age: 24
End: 2019-08-26
Payer: MEDICAID

## 2019-08-26 VITALS
WEIGHT: 152 LBS | BODY MASS INDEX: 28.7 KG/M2 | HEIGHT: 61 IN | DIASTOLIC BLOOD PRESSURE: 77 MMHG | SYSTOLIC BLOOD PRESSURE: 113 MMHG

## 2019-08-26 PROCEDURE — 0502F SUBSEQUENT PRENATAL CARE: CPT

## 2019-09-03 ENCOUNTER — OTHER (OUTPATIENT)
Age: 24
End: 2019-09-03

## 2019-09-06 ENCOUNTER — APPOINTMENT (OUTPATIENT)
Dept: OBGYN | Facility: CLINIC | Age: 24
End: 2019-09-06

## 2019-09-06 ENCOUNTER — OTHER (OUTPATIENT)
Age: 24
End: 2019-09-06

## 2019-09-11 ENCOUNTER — OTHER (OUTPATIENT)
Age: 24
End: 2019-09-11

## 2019-09-12 ENCOUNTER — OTHER (OUTPATIENT)
Age: 24
End: 2019-09-12

## 2019-09-12 LAB
GLUCOSE 1H P 50 G GLC PO SERPL-MCNC: 161 MG/DL
T PALLIDUM AB SER QL IA: NEGATIVE

## 2019-09-23 ENCOUNTER — ASOB RESULT (OUTPATIENT)
Age: 24
End: 2019-09-23

## 2019-09-23 ENCOUNTER — APPOINTMENT (OUTPATIENT)
Dept: OBGYN | Facility: CLINIC | Age: 24
End: 2019-09-23

## 2019-09-23 ENCOUNTER — APPOINTMENT (OUTPATIENT)
Dept: OBGYN | Facility: CLINIC | Age: 24
End: 2019-09-23
Payer: MEDICAID

## 2019-09-23 ENCOUNTER — APPOINTMENT (OUTPATIENT)
Dept: ANTEPARTUM | Facility: CLINIC | Age: 24
End: 2019-09-23
Payer: MEDICAID

## 2019-09-23 ENCOUNTER — NON-APPOINTMENT (OUTPATIENT)
Age: 24
End: 2019-09-23

## 2019-09-23 VITALS
BODY MASS INDEX: 29.83 KG/M2 | WEIGHT: 158 LBS | HEIGHT: 61 IN | DIASTOLIC BLOOD PRESSURE: 71 MMHG | SYSTOLIC BLOOD PRESSURE: 107 MMHG

## 2019-09-23 PROCEDURE — 76819 FETAL BIOPHYS PROFIL W/O NST: CPT

## 2019-09-23 PROCEDURE — 0502F SUBSEQUENT PRENATAL CARE: CPT

## 2019-09-23 PROCEDURE — 76816 OB US FOLLOW-UP PER FETUS: CPT

## 2019-10-05 ENCOUNTER — LABORATORY RESULT (OUTPATIENT)
Age: 24
End: 2019-10-05

## 2019-10-07 ENCOUNTER — OTHER (OUTPATIENT)
Age: 24
End: 2019-10-07

## 2019-10-09 ENCOUNTER — APPOINTMENT (OUTPATIENT)
Dept: MATERNAL FETAL MEDICINE | Facility: CLINIC | Age: 24
End: 2019-10-09
Payer: MEDICAID

## 2019-10-09 ENCOUNTER — ASOB RESULT (OUTPATIENT)
Age: 24
End: 2019-10-09

## 2019-10-09 VITALS — BODY MASS INDEX: 29.47 KG/M2 | WEIGHT: 156.06 LBS | HEIGHT: 61 IN

## 2019-10-09 DIAGNOSIS — O11.9 PRE-EXISTING HYPERTENSION WITH PRE-ECLAMPSIA, UNSPECIFIED TRIMESTER: ICD-10-CM

## 2019-10-09 PROCEDURE — G0108 DIAB MANAGE TRN  PER INDIV: CPT

## 2019-10-10 LAB
ESTIMATED AVERAGE GLUCOSE: 108 MG/DL
HBA1C MFR BLD HPLC: 5.4 %

## 2019-10-11 ENCOUNTER — APPOINTMENT (OUTPATIENT)
Dept: OBGYN | Facility: CLINIC | Age: 24
End: 2019-10-11
Payer: MEDICAID

## 2019-10-11 ENCOUNTER — NON-APPOINTMENT (OUTPATIENT)
Age: 24
End: 2019-10-11

## 2019-10-11 VITALS
DIASTOLIC BLOOD PRESSURE: 69 MMHG | BODY MASS INDEX: 29.55 KG/M2 | WEIGHT: 156.5 LBS | HEIGHT: 61 IN | SYSTOLIC BLOOD PRESSURE: 102 MMHG

## 2019-10-11 PROCEDURE — 90471 IMMUNIZATION ADMIN: CPT

## 2019-10-11 PROCEDURE — 0502F SUBSEQUENT PRENATAL CARE: CPT

## 2019-10-11 PROCEDURE — 90688 IIV4 VACCINE SPLT 0.5 ML IM: CPT

## 2019-10-15 ENCOUNTER — APPOINTMENT (OUTPATIENT)
Dept: MATERNAL FETAL MEDICINE | Facility: CLINIC | Age: 24
End: 2019-10-15

## 2019-10-21 ENCOUNTER — APPOINTMENT (OUTPATIENT)
Dept: OBGYN | Facility: CLINIC | Age: 24
End: 2019-10-21
Payer: MEDICAID

## 2019-10-21 ENCOUNTER — APPOINTMENT (OUTPATIENT)
Dept: ANTEPARTUM | Facility: CLINIC | Age: 24
End: 2019-10-21
Payer: MEDICAID

## 2019-10-21 ENCOUNTER — ASOB RESULT (OUTPATIENT)
Age: 24
End: 2019-10-21

## 2019-10-21 VITALS
HEIGHT: 61 IN | WEIGHT: 152.56 LBS | BODY MASS INDEX: 28.8 KG/M2 | DIASTOLIC BLOOD PRESSURE: 76 MMHG | SYSTOLIC BLOOD PRESSURE: 109 MMHG

## 2019-10-21 PROCEDURE — 90715 TDAP VACCINE 7 YRS/> IM: CPT

## 2019-10-21 PROCEDURE — 90471 IMMUNIZATION ADMIN: CPT

## 2019-10-21 PROCEDURE — 76816 OB US FOLLOW-UP PER FETUS: CPT

## 2019-10-21 PROCEDURE — 0502F SUBSEQUENT PRENATAL CARE: CPT

## 2019-10-22 ENCOUNTER — NON-APPOINTMENT (OUTPATIENT)
Age: 24
End: 2019-10-22

## 2019-10-24 ENCOUNTER — OTHER (OUTPATIENT)
Age: 24
End: 2019-10-24

## 2019-11-04 ENCOUNTER — APPOINTMENT (OUTPATIENT)
Dept: OBGYN | Facility: CLINIC | Age: 24
End: 2019-11-04
Payer: COMMERCIAL

## 2019-11-04 VITALS
SYSTOLIC BLOOD PRESSURE: 115 MMHG | BODY MASS INDEX: 29.16 KG/M2 | DIASTOLIC BLOOD PRESSURE: 83 MMHG | HEIGHT: 61 IN | WEIGHT: 154.44 LBS

## 2019-11-04 PROCEDURE — 0502F SUBSEQUENT PRENATAL CARE: CPT

## 2019-11-05 ENCOUNTER — NON-APPOINTMENT (OUTPATIENT)
Age: 24
End: 2019-11-05

## 2019-11-11 ENCOUNTER — ASOB RESULT (OUTPATIENT)
Age: 24
End: 2019-11-11

## 2019-11-11 ENCOUNTER — APPOINTMENT (OUTPATIENT)
Dept: MATERNAL FETAL MEDICINE | Facility: CLINIC | Age: 24
End: 2019-11-11
Payer: COMMERCIAL

## 2019-11-11 VITALS — WEIGHT: 152.06 LBS | BODY MASS INDEX: 28.71 KG/M2 | HEIGHT: 61 IN

## 2019-11-11 DIAGNOSIS — O24.410 GESTATIONAL DIABETES MELLITUS IN PREGNANCY, DIET CONTROLLED: ICD-10-CM

## 2019-11-11 PROCEDURE — G0108 DIAB MANAGE TRN  PER INDIV: CPT

## 2019-11-18 ENCOUNTER — APPOINTMENT (OUTPATIENT)
Dept: ANTEPARTUM | Facility: HOSPITAL | Age: 24
End: 2019-11-18

## 2019-11-18 ENCOUNTER — ASOB RESULT (OUTPATIENT)
Age: 24
End: 2019-11-18

## 2019-11-18 ENCOUNTER — APPOINTMENT (OUTPATIENT)
Dept: OBGYN | Facility: CLINIC | Age: 24
End: 2019-11-18
Payer: COMMERCIAL

## 2019-11-18 ENCOUNTER — APPOINTMENT (OUTPATIENT)
Dept: ANTEPARTUM | Facility: CLINIC | Age: 24
End: 2019-11-18
Payer: COMMERCIAL

## 2019-11-18 ENCOUNTER — OUTPATIENT (OUTPATIENT)
Dept: OUTPATIENT SERVICES | Facility: HOSPITAL | Age: 24
LOS: 1 days | End: 2019-11-18

## 2019-11-18 ENCOUNTER — NON-APPOINTMENT (OUTPATIENT)
Age: 24
End: 2019-11-18

## 2019-11-18 VITALS
BODY MASS INDEX: 29.27 KG/M2 | DIASTOLIC BLOOD PRESSURE: 80 MMHG | SYSTOLIC BLOOD PRESSURE: 118 MMHG | WEIGHT: 155 LBS | HEIGHT: 61 IN

## 2019-11-18 DIAGNOSIS — Z34.93 ENCOUNTER FOR SUPERVISION OF NORMAL PREGNANCY, UNSPECIFIED, THIRD TRIMESTER: ICD-10-CM

## 2019-11-18 DIAGNOSIS — Z98.89 OTHER SPECIFIED POSTPROCEDURAL STATES: Chronic | ICD-10-CM

## 2019-11-18 PROCEDURE — 0502F SUBSEQUENT PRENATAL CARE: CPT

## 2019-11-18 PROCEDURE — 76818 FETAL BIOPHYS PROFILE W/NST: CPT | Mod: 26

## 2019-11-18 PROCEDURE — 76820 UMBILICAL ARTERY ECHO: CPT

## 2019-11-18 PROCEDURE — 76816 OB US FOLLOW-UP PER FETUS: CPT

## 2019-11-20 LAB
GP B STREP DNA SPEC QL NAA+PROBE: NORMAL
GP B STREP DNA SPEC QL NAA+PROBE: NOT DETECTED
SOURCE GBS: NORMAL

## 2019-11-25 ENCOUNTER — APPOINTMENT (OUTPATIENT)
Dept: OBGYN | Facility: CLINIC | Age: 24
End: 2019-11-25

## 2019-11-25 ENCOUNTER — ASOB RESULT (OUTPATIENT)
Age: 24
End: 2019-11-25

## 2019-11-25 ENCOUNTER — OUTPATIENT (OUTPATIENT)
Dept: OUTPATIENT SERVICES | Facility: HOSPITAL | Age: 24
LOS: 1 days | End: 2019-11-25

## 2019-11-25 ENCOUNTER — APPOINTMENT (OUTPATIENT)
Dept: ANTEPARTUM | Facility: CLINIC | Age: 24
End: 2019-11-25
Payer: COMMERCIAL

## 2019-11-25 ENCOUNTER — APPOINTMENT (OUTPATIENT)
Dept: ANTEPARTUM | Facility: CLINIC | Age: 24
End: 2019-11-25

## 2019-11-25 DIAGNOSIS — Z98.89 OTHER SPECIFIED POSTPROCEDURAL STATES: Chronic | ICD-10-CM

## 2019-11-25 PROCEDURE — 76818 FETAL BIOPHYS PROFILE W/NST: CPT | Mod: 26

## 2019-11-25 PROCEDURE — 76820 UMBILICAL ARTERY ECHO: CPT

## 2019-12-02 ENCOUNTER — APPOINTMENT (OUTPATIENT)
Dept: ANTEPARTUM | Facility: HOSPITAL | Age: 24
End: 2019-12-02

## 2019-12-02 ENCOUNTER — APPOINTMENT (OUTPATIENT)
Dept: ANTEPARTUM | Facility: CLINIC | Age: 24
End: 2019-12-02

## 2019-12-03 ENCOUNTER — APPOINTMENT (OUTPATIENT)
Dept: OBGYN | Facility: CLINIC | Age: 24
End: 2019-12-03
Payer: COMMERCIAL

## 2019-12-03 VITALS
BODY MASS INDEX: 28.76 KG/M2 | HEIGHT: 61 IN | SYSTOLIC BLOOD PRESSURE: 117 MMHG | WEIGHT: 152.31 LBS | DIASTOLIC BLOOD PRESSURE: 84 MMHG

## 2019-12-03 DIAGNOSIS — Z34.92 ENCOUNTER FOR SUPERVISION OF NORMAL PREGNANCY, UNSPECIFIED, SECOND TRIMESTER: ICD-10-CM

## 2019-12-03 PROCEDURE — 0502F SUBSEQUENT PRENATAL CARE: CPT

## 2019-12-08 ENCOUNTER — INPATIENT (INPATIENT)
Facility: HOSPITAL | Age: 24
LOS: 1 days | Discharge: HOME | End: 2019-12-10
Attending: STUDENT IN AN ORGANIZED HEALTH CARE EDUCATION/TRAINING PROGRAM | Admitting: STUDENT IN AN ORGANIZED HEALTH CARE EDUCATION/TRAINING PROGRAM
Payer: COMMERCIAL

## 2019-12-08 ENCOUNTER — TRANSCRIPTION ENCOUNTER (OUTPATIENT)
Age: 24
End: 2019-12-08

## 2019-12-08 ENCOUNTER — EMERGENCY (EMERGENCY)
Facility: HOSPITAL | Age: 24
LOS: 1 days | Discharge: STILL ADMITTED OR ADMINISTRATIVE | End: 2019-12-08
Admitting: EMERGENCY MEDICINE

## 2019-12-08 VITALS
SYSTOLIC BLOOD PRESSURE: 125 MMHG | DIASTOLIC BLOOD PRESSURE: 89 MMHG | HEART RATE: 80 BPM | RESPIRATION RATE: 14 BRPM | TEMPERATURE: 98 F

## 2019-12-08 VITALS
RESPIRATION RATE: 16 BRPM | HEART RATE: 82 BPM | TEMPERATURE: 98 F | OXYGEN SATURATION: 100 % | SYSTOLIC BLOOD PRESSURE: 125 MMHG | DIASTOLIC BLOOD PRESSURE: 91 MMHG

## 2019-12-08 DIAGNOSIS — Z98.89 OTHER SPECIFIED POSTPROCEDURAL STATES: Chronic | ICD-10-CM

## 2019-12-08 DIAGNOSIS — O26.899 OTHER SPECIFIED PREGNANCY RELATED CONDITIONS, UNSPECIFIED TRIMESTER: ICD-10-CM

## 2019-12-08 DIAGNOSIS — Z90.49 ACQUIRED ABSENCE OF OTHER SPECIFIED PARTS OF DIGESTIVE TRACT: Chronic | ICD-10-CM

## 2019-12-08 DIAGNOSIS — Z3A.00 WEEKS OF GESTATION OF PREGNANCY NOT SPECIFIED: ICD-10-CM

## 2019-12-08 LAB
BASOPHILS # BLD AUTO: 0.03 K/UL — SIGNIFICANT CHANGE UP (ref 0–0.2)
BASOPHILS NFR BLD AUTO: 0.4 % — SIGNIFICANT CHANGE UP (ref 0–2)
BLD GP AB SCN SERPL QL: NEGATIVE — SIGNIFICANT CHANGE UP
EOSINOPHIL # BLD AUTO: 0.02 K/UL — SIGNIFICANT CHANGE UP (ref 0–0.5)
EOSINOPHIL NFR BLD AUTO: 0.3 % — SIGNIFICANT CHANGE UP (ref 0–6)
GLUCOSE BLDC GLUCOMTR-MCNC: 83 MG/DL — SIGNIFICANT CHANGE UP (ref 70–99)
GLUCOSE BLDC GLUCOMTR-MCNC: 93 MG/DL — SIGNIFICANT CHANGE UP (ref 70–99)
HCT VFR BLD CALC: 35.8 % — SIGNIFICANT CHANGE UP (ref 34.5–45)
HGB BLD-MCNC: 11.5 G/DL — SIGNIFICANT CHANGE UP (ref 11.5–15.5)
IMM GRANULOCYTES NFR BLD AUTO: 0.4 % — SIGNIFICANT CHANGE UP (ref 0–1.5)
LYMPHOCYTES # BLD AUTO: 1.03 K/UL — SIGNIFICANT CHANGE UP (ref 1–3.3)
LYMPHOCYTES # BLD AUTO: 13.6 % — SIGNIFICANT CHANGE UP (ref 13–44)
MCHC RBC-ENTMCNC: 26.9 PG — LOW (ref 27–34)
MCHC RBC-ENTMCNC: 32.1 % — SIGNIFICANT CHANGE UP (ref 32–36)
MCV RBC AUTO: 83.8 FL — SIGNIFICANT CHANGE UP (ref 80–100)
MONOCYTES # BLD AUTO: 0.64 K/UL — SIGNIFICANT CHANGE UP (ref 0–0.9)
MONOCYTES NFR BLD AUTO: 8.5 % — SIGNIFICANT CHANGE UP (ref 2–14)
NEUTROPHILS # BLD AUTO: 5.81 K/UL — SIGNIFICANT CHANGE UP (ref 1.8–7.4)
NEUTROPHILS NFR BLD AUTO: 76.8 % — SIGNIFICANT CHANGE UP (ref 43–77)
NRBC # FLD: 0 K/UL — SIGNIFICANT CHANGE UP (ref 0–0)
PLATELET # BLD AUTO: 184 K/UL — SIGNIFICANT CHANGE UP (ref 150–400)
PMV BLD: 12.7 FL — SIGNIFICANT CHANGE UP (ref 7–13)
RBC # BLD: 4.27 M/UL — SIGNIFICANT CHANGE UP (ref 3.8–5.2)
RBC # FLD: 14.4 % — SIGNIFICANT CHANGE UP (ref 10.3–14.5)
RH IG SCN BLD-IMP: POSITIVE — SIGNIFICANT CHANGE UP
T PALLIDUM AB TITR SER: NEGATIVE — SIGNIFICANT CHANGE UP
WBC # BLD: 7.56 K/UL — SIGNIFICANT CHANGE UP (ref 3.8–10.5)
WBC # FLD AUTO: 7.56 K/UL — SIGNIFICANT CHANGE UP (ref 3.8–10.5)

## 2019-12-08 PROCEDURE — 59410 OBSTETRICAL CARE: CPT | Mod: U9,UB,GC

## 2019-12-08 RX ORDER — SODIUM CHLORIDE 9 MG/ML
1000 INJECTION, SOLUTION INTRAVENOUS
Refills: 0 | Status: DISCONTINUED | OUTPATIENT
Start: 2019-12-08 | End: 2019-12-08

## 2019-12-08 RX ORDER — MAGNESIUM HYDROXIDE 400 MG/1
30 TABLET, CHEWABLE ORAL
Refills: 0 | Status: DISCONTINUED | OUTPATIENT
Start: 2019-12-08 | End: 2019-12-10

## 2019-12-08 RX ORDER — LANOLIN
1 OINTMENT (GRAM) TOPICAL EVERY 6 HOURS
Refills: 0 | Status: DISCONTINUED | OUTPATIENT
Start: 2019-12-08 | End: 2019-12-10

## 2019-12-08 RX ORDER — IBUPROFEN 200 MG
600 TABLET ORAL EVERY 6 HOURS
Refills: 0 | Status: COMPLETED | OUTPATIENT
Start: 2019-12-08 | End: 2020-11-05

## 2019-12-08 RX ORDER — GLYCERIN ADULT
1 SUPPOSITORY, RECTAL RECTAL AT BEDTIME
Refills: 0 | Status: DISCONTINUED | OUTPATIENT
Start: 2019-12-08 | End: 2019-12-10

## 2019-12-08 RX ORDER — BENZOCAINE 10 %
1 GEL (GRAM) MUCOUS MEMBRANE EVERY 6 HOURS
Refills: 0 | Status: DISCONTINUED | OUTPATIENT
Start: 2019-12-08 | End: 2019-12-10

## 2019-12-08 RX ORDER — AER TRAVELER 0.5 G/1
1 SOLUTION RECTAL; TOPICAL EVERY 4 HOURS
Refills: 0 | Status: DISCONTINUED | OUTPATIENT
Start: 2019-12-08 | End: 2019-12-10

## 2019-12-08 RX ORDER — OXYCODONE HYDROCHLORIDE 5 MG/1
5 TABLET ORAL
Refills: 0 | Status: DISCONTINUED | OUTPATIENT
Start: 2019-12-08 | End: 2019-12-10

## 2019-12-08 RX ORDER — SIMETHICONE 80 MG/1
80 TABLET, CHEWABLE ORAL EVERY 4 HOURS
Refills: 0 | Status: DISCONTINUED | OUTPATIENT
Start: 2019-12-08 | End: 2019-12-10

## 2019-12-08 RX ORDER — ACETAMINOPHEN 500 MG
3 TABLET ORAL
Qty: 0 | Refills: 0 | DISCHARGE
Start: 2019-12-08

## 2019-12-08 RX ORDER — TETANUS TOXOID, REDUCED DIPHTHERIA TOXOID AND ACELLULAR PERTUSSIS VACCINE, ADSORBED 5; 2.5; 8; 8; 2.5 [IU]/.5ML; [IU]/.5ML; UG/.5ML; UG/.5ML; UG/.5ML
0.5 SUSPENSION INTRAMUSCULAR ONCE
Refills: 0 | Status: DISCONTINUED | OUTPATIENT
Start: 2019-12-08 | End: 2019-12-10

## 2019-12-08 RX ORDER — OXYTOCIN 10 UNIT/ML
1 VIAL (ML) INJECTION
Qty: 30 | Refills: 0 | Status: DISCONTINUED | OUTPATIENT
Start: 2019-12-08 | End: 2019-12-09

## 2019-12-08 RX ORDER — IBUPROFEN 200 MG
1 TABLET ORAL
Qty: 0 | Refills: 0 | DISCHARGE
Start: 2019-12-08

## 2019-12-08 RX ORDER — SODIUM CHLORIDE 9 MG/ML
3 INJECTION INTRAMUSCULAR; INTRAVENOUS; SUBCUTANEOUS EVERY 8 HOURS
Refills: 0 | Status: DISCONTINUED | OUTPATIENT
Start: 2019-12-08 | End: 2019-12-10

## 2019-12-08 RX ORDER — PRAMOXINE HYDROCHLORIDE 150 MG/15G
1 AEROSOL, FOAM RECTAL EVERY 4 HOURS
Refills: 0 | Status: DISCONTINUED | OUTPATIENT
Start: 2019-12-08 | End: 2019-12-10

## 2019-12-08 RX ORDER — DIBUCAINE 1 %
1 OINTMENT (GRAM) RECTAL EVERY 6 HOURS
Refills: 0 | Status: DISCONTINUED | OUTPATIENT
Start: 2019-12-08 | End: 2019-12-10

## 2019-12-08 RX ORDER — CITRIC ACID/SODIUM CITRATE 300-500 MG
15 SOLUTION, ORAL ORAL EVERY 6 HOURS
Refills: 0 | Status: DISCONTINUED | OUTPATIENT
Start: 2019-12-08 | End: 2019-12-08

## 2019-12-08 RX ORDER — DIPHENHYDRAMINE HCL 50 MG
25 CAPSULE ORAL EVERY 6 HOURS
Refills: 0 | Status: DISCONTINUED | OUTPATIENT
Start: 2019-12-08 | End: 2019-12-10

## 2019-12-08 RX ORDER — KETOROLAC TROMETHAMINE 30 MG/ML
30 SYRINGE (ML) INJECTION ONCE
Refills: 0 | Status: DISCONTINUED | OUTPATIENT
Start: 2019-12-08 | End: 2019-12-08

## 2019-12-08 RX ORDER — OXYTOCIN 10 UNIT/ML
333.33 VIAL (ML) INJECTION
Qty: 20 | Refills: 0 | Status: DISCONTINUED | OUTPATIENT
Start: 2019-12-08 | End: 2019-12-09

## 2019-12-08 RX ORDER — IBUPROFEN 200 MG
600 TABLET ORAL EVERY 6 HOURS
Refills: 0 | Status: DISCONTINUED | OUTPATIENT
Start: 2019-12-08 | End: 2019-12-10

## 2019-12-08 RX ORDER — DOXYLAMINE SUCCINATE AND PYRIDOXINE HYDROCHLORIDE, DELAYED RELEASE TABLETS 10 MG/10 MG 10; 10 MG/1; MG/1
0 TABLET, DELAYED RELEASE ORAL
Qty: 0 | Refills: 0 | DISCHARGE

## 2019-12-08 RX ORDER — HYDROCORTISONE 1 %
1 OINTMENT (GRAM) TOPICAL EVERY 6 HOURS
Refills: 0 | Status: DISCONTINUED | OUTPATIENT
Start: 2019-12-08 | End: 2019-12-10

## 2019-12-08 RX ORDER — ACETAMINOPHEN 500 MG
975 TABLET ORAL
Refills: 0 | Status: DISCONTINUED | OUTPATIENT
Start: 2019-12-08 | End: 2019-12-10

## 2019-12-08 RX ORDER — OXYCODONE HYDROCHLORIDE 5 MG/1
5 TABLET ORAL ONCE
Refills: 0 | Status: DISCONTINUED | OUTPATIENT
Start: 2019-12-08 | End: 2019-12-10

## 2019-12-08 RX ORDER — OXYTOCIN 10 UNIT/ML
333.33 VIAL (ML) INJECTION
Qty: 20 | Refills: 0 | Status: COMPLETED | OUTPATIENT
Start: 2019-12-08 | End: 2019-12-08

## 2019-12-08 RX ADMIN — SODIUM CHLORIDE 125 MILLILITER(S): 9 INJECTION, SOLUTION INTRAVENOUS at 10:51

## 2019-12-08 RX ADMIN — Medication 30 MILLIGRAM(S): at 19:40

## 2019-12-08 RX ADMIN — Medication 1000 MILLIUNIT(S)/MIN: at 18:45

## 2019-12-08 RX ADMIN — Medication 30 MILLIGRAM(S): at 19:22

## 2019-12-08 NOTE — OB PROVIDER TRIAGE NOTE - NSOBPROVIDERNOTE_OBGYN_ALL_OB_FT
25 yo , EGA@39.4 weeks presented c/o contractions since 5 am, denies leaking fluid/vaginal bleeding, reports + fetal movements.  Prenatal course I significant for GDM, A1.  OB hx: 2016, , [id number], pregnancy complicated by cholestasis, PEC, GDM, A1            2017, , 5 lbs, IUGR, IOL@38 weeks  Med hx: denies  Surg hx: cholecystectomy,                appendectomy,   GYN hx: denies  Meds: PNV  Allergy: NKDA  Upon evaluation, Vital Signs: T(C): 36.8 (08 Dec 2019 08:20), HR: 96 (08 Dec 2019 08:43) (80 - 100); BP: 98/64 (08 Dec 2019 08:44) (98/64 - 125/91)  RR: 14 (08 Dec 2019 08:18) (14 - 16); SpO2: 97% (08 Dec 2019 08:43) (97% - 100%)  SVE: 2/80/-2, intact, reassuring FHTs, occasional variable deceleration noted; EFW 5 lbs by Leopold's; patient reported US 2 weeks ago, baby measuring 6 lbs  GBS negative  A: 25 yo , EGA@39.4 weeks, early labor,   P: BPP, monitor fetal status, consult with Dr Ibrahim, regarding further plan of care. 23 yo , EGA@39.4 weeks presented c/o contractions since 5 am, denies leaking fluid/vaginal bleeding, reports + fetal movements.  Prenatal course I significant for GDM, A1.  OB hx: 2016, , [id number], pregnancy complicated by cholestasis, PEC, GDM, A1            2017, , 5 lbs, IUGR, IOL@38 weeks  Med hx: denies  Surg hx: cholecystectomy, 2017               appendectomy,   GYN hx: denies  Meds: PNV  Allergy: NKDA  Upon evaluation, Vital Signs: T(C): 36.8 (08 Dec 2019 08:20), HR: 96 (08 Dec 2019 08:43) (80 - 100); BP: 98/64 (08 Dec 2019 08:44) (98/64 - 125/91)  RR: 14 (08 Dec 2019 08:18) (14 - 16); SpO2: 97% (08 Dec 2019 08:43) (97% - 100%)  SVE: 2/80/-2, intact, reassuring FHTs, occasional variable deceleration noted; EFW 5 lbs by Leopold's; patient reported US 2 weeks ago, baby measuring 6 lbs  GBS negative  A: 23 yo , EGA@39.4 weeks, early labor,   P: BPP, monitor fetal status, consult with Dr Ibrahim, regarding further plan of care.  addendum at 9:30 am, patient reports leaking fluid--> SROM was ruled in upon speculum exam; SVE: 2-3/80/-2, clear fluid, category 1 FHTs  A: admitted for labor, epidural as per patient's request, anticipate .

## 2019-12-08 NOTE — OB RN TRIAGE NOTE - PMH
October 18, 2019    Nurys Rose  [address]  [place] [address]             [facility]  [address] [place] [address]  Phone: [phone]  Fax: [phone] Dear Ms. Rose:    I have been unable to reach you by phone for your appointment to Breast Surgery. Please call me at the clinic [phone] to book your appointment.      If you have any questions or concerns, please don't hesitate to call.    Sincerely,        Christen Pearson MA      Normal vaginal delivery  2016 IOL GDM Cholestasis PEC6-12  2017 IOL IUGR5-0 @38wks  Polycystic ovary disease    UTI (lower urinary tract infection)

## 2019-12-08 NOTE — DISCHARGE NOTE OB - MEDICATION SUMMARY - MEDICATIONS TO TAKE
I will START or STAY ON the medications listed below when I get home from the hospital:    acetaminophen 325 mg oral tablet  -- 3 tab(s) by mouth   -- Indication: For for pain    ibuprofen 600 mg oral tablet  -- 1 tab(s) by mouth every 6 hours  -- Indication: For for pain

## 2019-12-08 NOTE — DISCHARGE NOTE OB - MATERIALS PROVIDED
[facility] [place] Screening Program/[place]  Immunization Record/Breastfeeding Log/Shaken Baby Prevention Handout/Back To Sleep Handout/Breastfeeding Guide and Packet/Vaccinations/Breastfeeding Mother’s Support Group Information/Guide to Postpartum Care/[facility] Hearing Screen Program

## 2019-12-08 NOTE — DISCHARGE NOTE OB - HOSPITAL COURSE
She presented in labor and progressed to fully dilated  She had an uncomplicated vaginal delivery   Girl

## 2019-12-08 NOTE — OB PROVIDER DELIVERY SUMMARY - NSPROVIDERDELIVERYNOTE_OBGYN_ALL_OB_FT
Attending Note     Pt progressed to 10/100/+3  Pushed viable female infant over intact perineum   Tight nuchal cord.   Body and shoulders delivered with no issue s  Placenta delivered intact  Rectum, vagina and cervix inspected   2nd degree laceration noted and repaired in usual fashion   Rectal exam intact

## 2019-12-08 NOTE — OB PROVIDER H&P - ASSESSMENT
25 yo , EGA@39.4 weeks presented c/o contractions since 5 am, denies leaking fluid/vaginal bleeding, reports + fetal movements; reported leaking fluid while on NST, at 9:30am.  Prenatal course I significant for GDM, A1.  OB hx: 2016, , [id number], pregnancy complicated by cholestasis, PEC, GDM, A1            2017, , 5 lbs, IUGR, IOL@38 weeks  Med hx: denies  Surg hx: cholecystectomy,                appendectomy,   GYN hx: denies  Meds: PNV  Allergy: NKDA  Upon evaluation, Vital Signs: T(C): 36.8 (08 Dec 2019 08:20), HR: 96 (08 Dec 2019 08:43) (80 - 100); BP: 98/64 (08 Dec 2019 08:44) (98/64 - 125/91)  RR: 14 (08 Dec 2019 08:18) (14 - 16); SpO2: 97% (08 Dec 2019 08:43) (97% - 100%)  SVE: 2/80/-2, SROM was ruled in upon speculum exam; SVE: 2-3/80/-2, clear fluid,; reassuring FHTs, occasional variable deceleration noted; EFW 5 lbs by Leopold's; patient reported US 2 weeks ago, baby measuring 6 lbs  GBS negative;   A: 25 yo , EGA@39.4 weeks, early labor,   P: A: admitted for labor, epidural as per patient's request, anticipate .; case was reviewed with Dr Ibrahim.

## 2019-12-08 NOTE — DISCHARGE NOTE OB - PATIENT PORTAL LINK FT
You can access the FollowMyHealth Patient Portal offered by [facility] by registering at the following website: [email or web address]. By joining [facility]’s FollowMyHealth portal, you will also be able to view your health information using other applications (apps) compatible with our system.

## 2019-12-08 NOTE — ED ADULT TRIAGE NOTE - CHIEF COMPLAINT QUOTE
Patient about 39 weeks pregnant c/o contractions q6mins. Patient reports mucous plug may have came out. PACHECO 12/11. MD dubin called for evaluation. L&D called, Patient to go to L&D.

## 2019-12-08 NOTE — OB PROVIDER TRIAGE NOTE - PMH
Normal vaginal delivery  2016 IOL GDM Cholestasis PEC6-12  2017 IOL IUGR5-0 @38wks  Polycystic ovary disease    UTI (lower urinary tract infection)

## 2019-12-08 NOTE — DISCHARGE NOTE OB - CARE PROVIDER_API CALL
Nash Browne)  OBSGYN  General  [facility] [facility], [address]th [place] [address]  Phone: [phone]  Fax: [phone]  Follow Up Time:

## 2019-12-08 NOTE — OB PROVIDER IHI INDUCTION/AUGMENTATION NOTE - NS_CHECKALL_OBGYN_ALL_OB
FHR was reviewed/Order was written/Contractions pattern was reviewed/H&P was completed/Induction / Augmentation was discussed

## 2019-12-08 NOTE — CHART NOTE - NSCHARTNOTEFT_GEN_A_CORE
NP note    Pt seen for increased pressure    Vital Signs Last 24 Hrs  T(C): 37.1 (08 Dec 2019 13:15), Max: 37.1 (08 Dec 2019 13:15)  T(F): 98.78 (08 Dec 2019 13:15), Max: 98.78 (08 Dec 2019 13:15)  HR: 90 (08 Dec 2019 15:53) (72 - 105)  BP: 96/53 (08 Dec 2019 15:49) (94/53 - 154/90)  BP(mean): --  RR: 16 (08 Dec 2019 10:15) (14 - 16)  SpO2: 97% (08 Dec 2019 15:53) (94% - 100%)  /mod shilpa/+ accels/occ variable  Samoset q3-5min  SVE 5/90/-2    -Pitocin 1x1 approved by Dr. Ibrahim  -Pt approved for top off    sredze, NP

## 2019-12-08 NOTE — CHART NOTE - NSCHARTNOTEFT_GEN_A_CORE
Attg.  VE - 3-4/80/-1 BFHR- 140 category 1, ctx's q 3-4 min. pt is comfortable with epidural. will continue to observe for progress.   Gini HUNTER

## 2019-12-08 NOTE — OB RN DELIVERY SUMMARY - NS_SEPSISRSKCALC_OBGYN_ALL_OB_FT
EOS calculated successfully. EOS Risk Factor: 0.5/1000 live births ([facility] national incidence); GA=39w4d; Temp=98.78; ROM=8.8; GBS='Negative'; Antibiotics='No antibiotics or any antibiotics < 2 hrs prior to birth'

## 2019-12-08 NOTE — OB RN PATIENT PROFILE - ATTEMPT TO OOB
Detail Level: Detailed Quality 431: Preventive Care And Screening: Unhealthy Alcohol Use - Screening: Patient screened for unhealthy alcohol use using a single question and scores less than 2 times per year Quality 226: Preventive Care And Screening: Tobacco Use: Screening And Cessation Intervention: Patient screened for tobacco use and is an ex/non-smoker Quality 110: Preventive Care And Screening: Influenza Immunization: Influenza Immunization not Administered because Patient Refused. Quality 130: Documentation Of Current Medications In The Medical Record: Current Medications Documented no

## 2019-12-08 NOTE — CHART NOTE - NSCHARTNOTEFT_GEN_A_CORE
R1 OB Progress Note    Patient seen and evaluated at bedside.  Denies complaints.    T(C): 36.9 (19 @ 10:15), Max: 36.9 (19 @ 10:15)  HR: 75 (19 @ 13:13) (74 - 105)  BP: 115/76 (19 @ 13:03) (98/64 - 154/90)  RR: 16 (19 @ 10:15) (14 - 16)  SpO2: 97% (19 @ 13:18) (94% - 100%)    SVE: 5/80/-2    EFM: 140 moderate variability +accels +variable decels   [place]:  ctx 15min    A/P 24y  admitted in labor after SROM at 9:30AM:   -Labor: Continue expectant management   -Fetus: Category II tracing. Continue resuscitation PRN with positioning, fluids, supplemental O2   -GBS negative    Cande Mcdaniels, PGY-1  d/w Dr. Ibrahim

## 2019-12-09 ENCOUNTER — APPOINTMENT (OUTPATIENT)
Dept: OBGYN | Facility: CLINIC | Age: 24
End: 2019-12-09

## 2019-12-09 RX ADMIN — Medication 975 MILLIGRAM(S): at 11:00

## 2019-12-09 RX ADMIN — Medication 600 MILLIGRAM(S): at 14:35

## 2019-12-09 RX ADMIN — Medication 600 MILLIGRAM(S): at 21:20

## 2019-12-09 RX ADMIN — Medication 600 MILLIGRAM(S): at 13:56

## 2019-12-09 RX ADMIN — SODIUM CHLORIDE 3 MILLILITER(S): 9 INJECTION INTRAMUSCULAR; INTRAVENOUS; SUBCUTANEOUS at 04:10

## 2019-12-09 RX ADMIN — Medication 975 MILLIGRAM(S): at 03:30

## 2019-12-09 RX ADMIN — Medication 975 MILLIGRAM(S): at 10:20

## 2019-12-09 RX ADMIN — SODIUM CHLORIDE 3 MILLILITER(S): 9 INJECTION INTRAMUSCULAR; INTRAVENOUS; SUBCUTANEOUS at 22:00

## 2019-12-09 RX ADMIN — Medication 600 MILLIGRAM(S): at 20:56

## 2019-12-09 RX ADMIN — Medication 975 MILLIGRAM(S): at 18:10

## 2019-12-09 RX ADMIN — Medication 975 MILLIGRAM(S): at 04:10

## 2019-12-09 RX ADMIN — Medication 975 MILLIGRAM(S): at 17:35

## 2019-12-09 RX ADMIN — SODIUM CHLORIDE 3 MILLILITER(S): 9 INJECTION INTRAMUSCULAR; INTRAVENOUS; SUBCUTANEOUS at 00:00

## 2019-12-09 NOTE — PROGRESS NOTE ADULT - SUBJECTIVE AND OBJECTIVE BOX
S: Patient doing well. Minimal lochia. Pain controlled.    O: Vital Signs Last 24 Hrs  T(C): 36.6 (09 Dec 2019 04:58), Max: 37.2 (09 Dec 2019 01:43)  T(F): 97.9 (09 Dec 2019 04:58), Max: 99 (09 Dec 2019 01:43)  HR: 82 (09 Dec 2019 04:58) (68 - 146)  BP: 115/73 (09 Dec 2019 04:58) (96/53 - 135/95)  BP(mean): --  RR: 17 (09 Dec 2019 04:58) (16 - 17)  SpO2: 99% (09 Dec 2019 04:58) (96% - 100%)    Gen: NAD  Abd: soft, NT, ND, fundus firm below umbilicus  Lochia: moderate  Ext: no tenderness    Labs:                        11.5   7.56  )-----------( 184      ( 08 Dec 2019 09:59 )             35.8       A: 24y PPD# 1 s/p  doing well.    Plan:  routine postpartum care  discharge planning

## 2019-12-09 NOTE — PROGRESS NOTE ADULT - SUBJECTIVE AND OBJECTIVE BOX
Patient doing well. OOBAA. Tolerating clears. Pain controlled. No residual anesthetic issues or complications noted.

## 2019-12-10 VITALS
DIASTOLIC BLOOD PRESSURE: 75 MMHG | OXYGEN SATURATION: 99 % | RESPIRATION RATE: 17 BRPM | HEART RATE: 92 BPM | TEMPERATURE: 98 F | SYSTOLIC BLOOD PRESSURE: 114 MMHG

## 2019-12-10 RX ADMIN — Medication 975 MILLIGRAM(S): at 01:17

## 2019-12-10 RX ADMIN — Medication 975 MILLIGRAM(S): at 01:45

## 2019-12-10 RX ADMIN — Medication 975 MILLIGRAM(S): at 12:26

## 2019-12-10 RX ADMIN — Medication 600 MILLIGRAM(S): at 05:38

## 2019-12-10 RX ADMIN — Medication 600 MILLIGRAM(S): at 06:10

## 2019-12-10 RX ADMIN — Medication 975 MILLIGRAM(S): at 13:10

## 2019-12-16 ENCOUNTER — APPOINTMENT (OUTPATIENT)
Dept: OBGYN | Facility: CLINIC | Age: 24
End: 2019-12-16

## 2019-12-19 DIAGNOSIS — O09.293 SUPERVISION OF PREGNANCY WITH OTHER POOR REPRODUCTIVE OR OBSTETRIC HISTORY, THIRD TRIMESTER: ICD-10-CM

## 2019-12-19 DIAGNOSIS — O24.410 GESTATIONAL DIABETES MELLITUS IN PREGNANCY, DIET CONTROLLED: ICD-10-CM

## 2019-12-19 DIAGNOSIS — O41.93X0 DISORDER OF AMNIOTIC FLUID AND MEMBRANES, UNSPECIFIED, THIRD TRIMESTER, NOT APPLICABLE OR UNSPECIFIED: ICD-10-CM

## 2020-01-13 ENCOUNTER — APPOINTMENT (OUTPATIENT)
Dept: OBGYN | Facility: CLINIC | Age: 25
End: 2020-01-13
Payer: MEDICAID

## 2020-01-13 VITALS
DIASTOLIC BLOOD PRESSURE: 74 MMHG | WEIGHT: 136 LBS | HEIGHT: 61 IN | SYSTOLIC BLOOD PRESSURE: 109 MMHG | HEART RATE: 88 BPM | BODY MASS INDEX: 25.68 KG/M2

## 2020-01-13 PROCEDURE — 0502F SUBSEQUENT PRENATAL CARE: CPT

## 2020-01-14 NOTE — HISTORY OF PRESENT ILLNESS
[Postpartum Follow Up] : postpartum follow up [Complications:___] : complications include: [unfilled] [Delivery Date: ___] : on [unfilled] [] : delivered by vaginal delivery [Female] : Delivery History: baby girl [Girl] : baby is a girl [Infant's Name ___] : [unfilled] [___ Lbs] : [unfilled] lbs [___ Oz] : [unfilled] oz [Living at Home] : is currently living at home [Breastfeeding] : currently nursing [Intended Contraception] : the patient does not intended to use contraception postpartum [Resumed Menses] : has not resumed her menses [Resumed Riddle] : has not resumed intercourse [S/Sx PP Depression] : signs/symptoms of postpartum depression [[place] Depression Scale ___ (0-30)] : [unfilled] [(1) Not quite so much now] : (1) Not very often [(0) No, not at all] : (0) No, not at all [(1) Not very often] : (1) Not very often [Chills] : no chills [Dysuria] : no dysuria [Fever] : no fever [Back to Normal] : is back to normal in size [Normal] : the vagina was normal [None] : no vaginal bleeding [Healing Well] : is healing well [Examination Of The Breasts] : breasts are normal [Doing Well] : is doing well [No Sign of Infection] : is showing no signs of infection [Excellent Pain Control] : has excellent pain control [de-identified] : rto for annual exam. does not want birth control

## 2020-08-13 ENCOUNTER — EMERGENCY (EMERGENCY)
Facility: HOSPITAL | Age: 25
LOS: 1 days | Discharge: HOME | End: 2020-08-13
Attending: EMERGENCY MEDICINE
Payer: COMMERCIAL

## 2020-08-13 VITALS
HEART RATE: 85 BPM | RESPIRATION RATE: 16 BRPM | HEIGHT: 61 IN | SYSTOLIC BLOOD PRESSURE: 121 MMHG | DIASTOLIC BLOOD PRESSURE: 85 MMHG | OXYGEN SATURATION: 98 % | TEMPERATURE: 99 F | WEIGHT: 138.01 LBS

## 2020-08-13 DIAGNOSIS — Z90.49 ACQUIRED ABSENCE OF OTHER SPECIFIED PARTS OF DIGESTIVE TRACT: Chronic | ICD-10-CM

## 2020-08-13 DIAGNOSIS — Z98.89 OTHER SPECIFIED POSTPROCEDURAL STATES: Chronic | ICD-10-CM

## 2020-08-13 LAB
ALBUMIN SERPL ELPH-MCNC: 4.6 G/DL — SIGNIFICANT CHANGE UP (ref 3.3–5)
ALP SERPL-CCNC: 131 U/L — HIGH (ref 40–120)
ALT FLD-CCNC: 25 U/L — SIGNIFICANT CHANGE UP (ref 10–45)
ANION GAP SERPL CALC-SCNC: 11 MMOL/L — SIGNIFICANT CHANGE UP (ref 5–17)
AST SERPL-CCNC: 22 U/L — SIGNIFICANT CHANGE UP (ref 10–40)
BASOPHILS # BLD AUTO: 0.04 K/UL — SIGNIFICANT CHANGE UP (ref 0–0.2)
BASOPHILS NFR BLD AUTO: 0.5 % — SIGNIFICANT CHANGE UP (ref 0–2)
BILIRUB SERPL-MCNC: 0.2 MG/DL — SIGNIFICANT CHANGE UP (ref 0.2–1.2)
BUN SERPL-MCNC: 14 MG/DL — SIGNIFICANT CHANGE UP (ref 7–23)
CALCIUM SERPL-MCNC: 9.3 MG/DL — SIGNIFICANT CHANGE UP (ref 8.4–10.5)
CHLORIDE SERPL-SCNC: 101 MMOL/L — SIGNIFICANT CHANGE UP (ref 96–108)
CO2 SERPL-SCNC: 25 MMOL/L — SIGNIFICANT CHANGE UP (ref 22–31)
CREAT SERPL-MCNC: 0.59 MG/DL — SIGNIFICANT CHANGE UP (ref 0.5–1.3)
EOSINOPHIL # BLD AUTO: 0.14 K/UL — SIGNIFICANT CHANGE UP (ref 0–0.5)
EOSINOPHIL NFR BLD AUTO: 1.6 % — SIGNIFICANT CHANGE UP (ref 0–6)
GLUCOSE SERPL-MCNC: 92 MG/DL — SIGNIFICANT CHANGE UP (ref 70–99)
HCG SERPL-ACNC: <2 MIU/ML — SIGNIFICANT CHANGE UP
HCT VFR BLD CALC: 42.3 % — SIGNIFICANT CHANGE UP (ref 34.5–45)
HGB BLD-MCNC: 13.4 G/DL — SIGNIFICANT CHANGE UP (ref 11.5–15.5)
IMM GRANULOCYTES NFR BLD AUTO: 0.3 % — SIGNIFICANT CHANGE UP (ref 0–1.5)
LYMPHOCYTES # BLD AUTO: 2.12 K/UL — SIGNIFICANT CHANGE UP (ref 1–3.3)
LYMPHOCYTES # BLD AUTO: 24.5 % — SIGNIFICANT CHANGE UP (ref 13–44)
MAGNESIUM SERPL-MCNC: 2 MG/DL — SIGNIFICANT CHANGE UP (ref 1.6–2.6)
MCHC RBC-ENTMCNC: 29.3 PG — SIGNIFICANT CHANGE UP (ref 27–34)
MCHC RBC-ENTMCNC: 31.7 GM/DL — LOW (ref 32–36)
MCV RBC AUTO: 92.4 FL — SIGNIFICANT CHANGE UP (ref 80–100)
MONOCYTES # BLD AUTO: 0.67 K/UL — SIGNIFICANT CHANGE UP (ref 0–0.9)
MONOCYTES NFR BLD AUTO: 7.7 % — SIGNIFICANT CHANGE UP (ref 2–14)
NEUTROPHILS # BLD AUTO: 5.67 K/UL — SIGNIFICANT CHANGE UP (ref 1.8–7.4)
NEUTROPHILS NFR BLD AUTO: 65.4 % — SIGNIFICANT CHANGE UP (ref 43–77)
NRBC # BLD: 0 /100 WBCS — SIGNIFICANT CHANGE UP (ref 0–0)
PHOSPHATE SERPL-MCNC: 3.3 MG/DL — SIGNIFICANT CHANGE UP (ref 2.5–4.5)
PLATELET # BLD AUTO: 227 K/UL — SIGNIFICANT CHANGE UP (ref 150–400)
POTASSIUM SERPL-MCNC: 3.8 MMOL/L — SIGNIFICANT CHANGE UP (ref 3.5–5.3)
POTASSIUM SERPL-SCNC: 3.8 MMOL/L — SIGNIFICANT CHANGE UP (ref 3.5–5.3)
PROT SERPL-MCNC: 7.6 G/DL — SIGNIFICANT CHANGE UP (ref 6–8.3)
RBC # BLD: 4.58 M/UL — SIGNIFICANT CHANGE UP (ref 3.8–5.2)
RBC # FLD: 12.2 % — SIGNIFICANT CHANGE UP (ref 10.3–14.5)
SODIUM SERPL-SCNC: 137 MMOL/L — SIGNIFICANT CHANGE UP (ref 135–145)
WBC # BLD: 8.67 K/UL — SIGNIFICANT CHANGE UP (ref 3.8–10.5)
WBC # FLD AUTO: 8.67 K/UL — SIGNIFICANT CHANGE UP (ref 3.8–10.5)

## 2020-08-13 PROCEDURE — 99283 EMERGENCY DEPT VISIT LOW MDM: CPT | Mod: GC

## 2020-08-13 PROCEDURE — 99220: CPT

## 2020-08-13 PROCEDURE — 71046 X-RAY EXAM CHEST 2 VIEWS: CPT | Mod: 26

## 2020-08-13 RX ORDER — ACETAMINOPHEN 500 MG
975 TABLET ORAL ONCE
Refills: 0 | Status: COMPLETED | OUTPATIENT
Start: 2020-08-13 | End: 2020-08-13

## 2020-08-13 NOTE — ED PROVIDER NOTE - NS ED ROS FT
CONST: no fevers, no chills  EYES: + blurry vision, no pain  ENT: no sore throat, no ear pain, no change in hearing  CV: no chest pain, no leg swelling  RESP: no shortness of breath, no cough  ABD: no abdominal pain, no nausea, no vomiting, no diarrhea  : no dysuria, no flank pain, no hematuria  MSK: no back pain, no extremity pain  NEURO: + bilateral upper extremity paresthesias, decreased  strength bilaterally, no headache  HEME: no easy bleeding  SKIN:  no rash

## 2020-08-13 NOTE — ED PROVIDER NOTE - CLINICAL SUMMARY MEDICAL DECISION MAKING FREE TEXT BOX
25 year old otherwise healthy female presenting with 3-4 days bilateral UE paresthesias, weakness, decreased  strength and intermittent blurry vision. No FND on exam, 5/5 strength upper extremities, sensation intact. Cervical spine nontender. DDX includes MS vs cervical spinal lesion. Will consult neurology, possible MRI cervical spine.

## 2020-08-13 NOTE — ED ADULT NURSE NOTE - OBJECTIVE STATEMENT
Pt presents for eval of numbness of upper arms, hands and face.  Speech is clear, no facial asymmetry, no trauma. She does report difficulty with fine moter control, like picking up a pen.

## 2020-08-13 NOTE — CONSULT NOTE ADULT - SUBJECTIVE AND OBJECTIVE BOX
HPI: 26yo RH woman with no pmh presenting with progressive tingling in b/l face, shoulders, arms, fingertips with some right-handed weakness with Neurology consult for concern for MS. Patient reports that she woke up about 3 days ago feeling tingling in her b/l forearms. Constantly there, would not wax/wane throughout day. Over the past couple of days, this has progressed to including the dorsal surfaces of both hands, fingertips, both shoulders, and both parts of the face. She reports associated band-like headache upon her forehead and intermittent blurry vision, which she currently does not have. No head or neck trauma. No fevers, chills, sob, cp, endorses reduced dexterity of her right hand when trying to open a jar earlier today, no difficulty swallowing, no falls or weakness in lower extremities b/l. Never occurred in the past. She reports history of migraine headaches right-sided with associated photophobia & phonophobia requiring a dark room to make better with occasional excedrin frequency of once every 2 months. She denies having had a recent migraine headache. Nonsmoker, does not take birth control nor estrogen containing products. Takes no medications. Family history of migraines. No recent diarrhea, burning with urination. No urinary or fecal incontinence. No back pain. Never had vision loss or disturbance or such symptoms in the past before.    REVIEW OF SYSTEMS    A 10-system ROS was performed and is negative except for those items noted above and/or in the HPI.    PAST MEDICAL & SURGICAL HISTORY:  Normal vaginal delivery: 2016 IOL GDM Cholestasis PEC6-12  2017 IOL IUGR5-0 @38wks  Polycystic ovary disease  UTI (lower urinary tract infection)  History of cholecystectomy  History of appendectomy    FAMILY HISTORY:  No pertinent family history in first degree relatives    MEDICATIONS (HOME):  Home Medications:  acetaminophen 325 mg oral tablet: 3 tab(s) orally  (08 Dec 2019 18:49)  ibuprofen 600 mg oral tablet: 1 tab(s) orally every 6 hours (08 Dec 2019 18:49)    MEDICATIONS  (STANDING):  acetaminophen   Tablet .. 975 milliGRAM(s) Oral once    MEDICATIONS  (PRN):    ALLERGIES/INTOLERANCES:  Allergies  No Known Allergies    VITALS & EXAMINATION:  Vital Signs Last 24 Hrs  T(C): 37 (13 Aug 2020 19:19), Max: 37 (13 Aug 2020 19:19)  T(F): 98.6 (13 Aug 2020 19:19), Max: 98.6 (13 Aug 2020 19:19)  HR: 85 (13 Aug 2020 19:19) (85 - 85)  BP: 121/85 (13 Aug 2020 19:19) (121/85 - 121/85)  BP(mean): --  RR: 16 (13 Aug 2020 19:19) (16 - 16)  SpO2: 98% (13 Aug 2020 19:19) (98% - 98%)    Neurological (>12):  MS: Awake, alert, oriented to person, place, situation, time. Normal affect. Follows all commands.    Language: Speech is clear, fluent with good repetition & comprehension    CNs: PERRLA (R = 4mm, L = 4mm). VFF. EOMI no nystagmus. V1-3 intact to LT/pinprick, well developed masseter muscles b/l. No facial asymmetry b/l, full eye closure strength b/l. Hearing grossly normal (rubbing fingers) b/l. Symmetric palate elevation in midline. Gag reflex deferred. Head turning & shoulder shrug intact b/l. Tongue midline, normal movements, no atrophy.    Fundoscopic: fundoscopic examination equivocal, difficult to visualize optic discs b/l      Motor: Normal muscle bulk & tone. No noticeable tremor. Mild R pronator drift. Noted mild orbiting and awkward right arm movement when asking patient to move arms. neck flexion/extension/lateral rotation full.               Deltoid	Biceps	Triceps	Wrist	Finger ABd	   R	4	4+	4+	4+	4-		4- 	  L	5	5	5	5	5		5    	H-Flex	H-Ext	H-ABd	H-ADd	K-Flex	K-Ext	D-Flex	P-Flex  R	5	5	5	5	5	5	5	5 	   L	5	5	5	5	5	5	5	5	     Sensation: Reduced to LT & PP upon right shoulder, arm, hand compared to left side. Proprioception & vibration intact b/l upper and lower extremities.    Cortical: Extinction on DSS (neglect): none    Reflexes: no clonus upon rapid dorsiflexion of feet b/l              Biceps(C5)       BR(C6)     Triceps(C7)               Patellar(L4)    Achilles(S1)    Plantar Resp  R	3	          3             3		        2		    2		Down   L	2	          2	             2		        2		    2		Down     Coordination: undershooting of right arm upon FTN testing    Gait:  No postural instability. Normal stance and tandem gait.     LABORATORY:  CBC                       13.4   8.67  )-----------( 227      ( 13 Aug 2020 21:46 )             42.3     Chem 08-13    137  |  101  |  14  ----------------------------<  92  3.8   |  25  |  0.59    Ca    9.3      13 Aug 2020 21:46  Phos  3.3     08-13  Mg     2.0     08-13    TPro  7.6  /  Alb  4.6  /  TBili  0.2  /  DBili  x   /  AST  22  /  ALT  25  /  AlkPhos  131<H>  08-13    LFTs LIVER FUNCTIONS - ( 13 Aug 2020 21:46 )  Alb: 4.6 g/dL / Pro: 7.6 g/dL / ALK PHOS: 131 U/L / ALT: 25 U/L / AST: 22 U/L / GGT: x           Coagulopathy   Lipid Panel   A1c   Cardiac enzymes     U/A   CSF  Immunological  Other    STUDIES & IMAGING:  Studies (EKG, EEG, EMG, etc):     Radiology (XR, CT, MR, U/S, TTE/SHUN): HPI: 24yo RH woman with no pmh presenting with progressive tingling in b/l face, shoulders, arms, fingertips with some right-handed weakness with Neurology consult for concern for MS. Patient reports that she woke up about 3 days ago feeling tingling in her b/l forearms. Constantly there, would not wax/wane throughout day. Over the past couple of days, this has progressed to including the dorsal surfaces of both hands, fingertips, both shoulders, and both parts of the face. She reports associated band-like headache upon her forehead and intermittent blurry vision, which she currently does not have. No head or neck trauma. No fevers, chills, sob, cp, endorses reduced dexterity of her right hand when trying to open a jar earlier today, no difficulty swallowing, no falls or weakness in lower extremities b/l. Never occurred in the past. She reports history of migraine headaches right-sided with associated photophobia & phonophobia requiring a dark room to make better with occasional excedrin frequency of once every 2 months. She denies having had a recent migraine headache. Nonsmoker, does not take birth control nor estrogen containing products. Takes no medications. Family history of migraines. No recent diarrhea, burning with urination. No urinary or fecal incontinence. No back pain. Never had vision loss or disturbance or such symptoms in the past before.    REVIEW OF SYSTEMS    A 10-system ROS was performed and is negative except for those items noted above and/or in the HPI.    PAST MEDICAL & SURGICAL HISTORY:  Normal vaginal delivery: 2016 IOL GDM Cholestasis PEC6-12  2017 IOL IUGR5-0 @38wks  Polycystic ovary disease  UTI (lower urinary tract infection)  History of cholecystectomy  History of appendectomy    FAMILY HISTORY:  No pertinent family history in first degree relatives    MEDICATIONS (HOME):  Home Medications:  acetaminophen 325 mg oral tablet: 3 tab(s) orally  (08 Dec 2019 18:49)  ibuprofen 600 mg oral tablet: 1 tab(s) orally every 6 hours (08 Dec 2019 18:49)    MEDICATIONS  (STANDING):  acetaminophen   Tablet .. 975 milliGRAM(s) Oral once    MEDICATIONS  (PRN):    ALLERGIES/INTOLERANCES:  Allergies  No Known Allergies    VITALS & EXAMINATION:  Vital Signs Last 24 Hrs  T(C): 37 (13 Aug 2020 19:19), Max: 37 (13 Aug 2020 19:19)  T(F): 98.6 (13 Aug 2020 19:19), Max: 98.6 (13 Aug 2020 19:19)  HR: 85 (13 Aug 2020 19:19) (85 - 85)  BP: 121/85 (13 Aug 2020 19:19) (121/85 - 121/85)  BP(mean): --  RR: 16 (13 Aug 2020 19:19) (16 - 16)  SpO2: 98% (13 Aug 2020 19:19) (98% - 98%)    Neurological (>12):  MS: Awake, alert, oriented to person, place, situation, time. Normal affect. Follows all commands.    Language: Speech is clear, fluent with good repetition & comprehension    CNs: PERRLA (R = 4mm, L = 4mm). VFF. EOMI no nystagmus. Visual acuity 20/20 OD, OS. mild discomfort in right eye with EOM testing. V1-3 intact to LT/pinprick, well developed masseter muscles b/l. No facial asymmetry b/l, full eye closure strength b/l. Hearing grossly normal (rubbing fingers) b/l. Symmetric palate elevation in midline. Gag reflex deferred. Head turning & shoulder shrug intact b/l. Tongue midline, normal movements, no atrophy.    Fundoscopic: fundoscopic examination equivocal, difficult to visualize optic discs b/l      Motor: Normal muscle bulk & tone. No noticeable tremor. Mild R pronator drift. Noted mild orbiting and awkward right arm movement when asking patient to move arms. neck flexion/extension/lateral rotation full.               Deltoid	Biceps	Triceps	Wrist	Finger ABd	   R	4	4+	4+	4+	4-		4- 	  L	5	5	5	5	5		5    	H-Flex	H-Ext	H-ABd	H-ADd	K-Flex	K-Ext	D-Flex	P-Flex  R	5	5	5	5	5	5	5	5 	   L	5	5	5	5	5	5	5	5	     Sensation: Reduced to LT & PP upon right shoulder, arm, hand compared to left side. Proprioception & vibration intact b/l upper and lower extremities.    Cortical: Extinction on DSS (neglect): none    Reflexes: no clonus upon rapid dorsiflexion of feet b/l              Biceps(C5)       BR(C6)     Triceps(C7)               Patellar(L4)    Achilles(S1)    Plantar Resp  R	3	          3             3		        2		    2		Down   L	2	          2	             2		        2		    2		Down     Coordination: undershooting of right arm upon FTN testing    Gait:  No postural instability. Normal stance and tandem gait.     LABORATORY:  CBC                       13.4   8.67  )-----------( 227      ( 13 Aug 2020 21:46 )             42.3     Chem 08-13    137  |  101  |  14  ----------------------------<  92  3.8   |  25  |  0.59    Ca    9.3      13 Aug 2020 21:46  Phos  3.3     08-13  Mg     2.0     08-13    TPro  7.6  /  Alb  4.6  /  TBili  0.2  /  DBili  x   /  AST  22  /  ALT  25  /  AlkPhos  131<H>  08-13    LFTs LIVER FUNCTIONS - ( 13 Aug 2020 21:46 )  Alb: 4.6 g/dL / Pro: 7.6 g/dL / ALK PHOS: 131 U/L / ALT: 25 U/L / AST: 22 U/L / GGT: x           Coagulopathy   Lipid Panel   A1c   Cardiac enzymes     U/A   CSF  Immunological  Other    STUDIES & IMAGING:  Studies (EKG, EEG, EMG, etc):     Radiology (XR, CT, MR, U/S, TTE/SHUN): HPI: 24yo RH woman with no pmh presenting with progressive tingling in b/l face, shoulders, arms, fingertips with some right-handed weakness with Neurology consult for concern for MS. Patient reports that she woke up about 3 days ago feeling tingling in her b/l forearms. Constantly there, would not wax/wane throughout day. Over the past couple of days, this has progressed to including the dorsal surfaces of both hands, fingertips, both shoulders, and both parts of the face. She reports associated band-like headache upon her forehead and intermittent blurry vision, which she currently does not have. No head or neck trauma. No fevers, chills, sob, cp, endorses reduced dexterity of her right hand when trying to open a jar earlier today, no difficulty swallowing, no falls or weakness in lower extremities b/l. Never occurred in the past. She reports history of migraine headaches right-sided with associated photophobia & phonophobia requiring a dark room to make better with occasional excedrin frequency of once every 2 months. She denies having had a recent migraine headache. Nonsmoker, does not take birth control nor estrogen containing products. Takes no medications. Family history of migraines. No recent diarrhea, burning with urination. No urinary or fecal incontinence. No back pain. Never had vision loss or disturbance or such symptoms in the past before.    REVIEW OF SYSTEMS    A 10-system ROS was performed and is negative except for those items noted above and/or in the HPI.    PAST MEDICAL & SURGICAL HISTORY:  Normal vaginal delivery: 2016 IOL GDM Cholestasis PEC6-12  2017 IOL IUGR5-0 @38wks  Polycystic ovary disease  UTI (lower urinary tract infection)  History of cholecystectomy  History of appendectomy    FAMILY HISTORY:  No pertinent family history in first degree relatives    MEDICATIONS (HOME):  Home Medications:  acetaminophen 325 mg oral tablet: 3 tab(s) orally  (08 Dec 2019 18:49)  ibuprofen 600 mg oral tablet: 1 tab(s) orally every 6 hours (08 Dec 2019 18:49)    MEDICATIONS  (STANDING):  acetaminophen   Tablet .. 975 milliGRAM(s) Oral once    MEDICATIONS  (PRN):    ALLERGIES/INTOLERANCES:  Allergies  No Known Allergies    VITALS & EXAMINATION:  Vital Signs Last 24 Hrs  T(C): 37 (13 Aug 2020 19:19), Max: 37 (13 Aug 2020 19:19)  T(F): 98.6 (13 Aug 2020 19:19), Max: 98.6 (13 Aug 2020 19:19)  HR: 85 (13 Aug 2020 19:19) (85 - 85)  BP: 121/85 (13 Aug 2020 19:19) (121/85 - 121/85)  BP(mean): --  RR: 16 (13 Aug 2020 19:19) (16 - 16)  SpO2: 98% (13 Aug 2020 19:19) (98% - 98%)    Neurological (>12):  MS: Awake, alert, oriented to person, place, situation, time. Normal affect. Follows all commands.    Language: Speech is clear, fluent with good repetition & comprehension    CNs: PERRLA (R = 4mm, L = 4mm). VFF. EOMI no nystagmus. Visual acuity 20/20 OD, OS. mild discomfort in right eye with EOM testing. V1-3 intact to LT/pinprick, well developed masseter muscles b/l. No facial asymmetry b/l, full eye closure strength b/l. Hearing grossly normal (rubbing fingers) b/l. Symmetric palate elevation in midline. Gag reflex deferred. Head turning & shoulder shrug intact b/l. Tongue midline, normal movements, no atrophy.    no temporal tenderness    Fundoscopic: fundoscopic examination equivocal, difficult to visualize optic discs b/l      Motor: Normal muscle bulk & tone. No noticeable tremor. Mild R pronator drift. Noted mild orbiting and awkward right arm movement when asking patient to move arms. neck flexion/extension/lateral rotation full.               Deltoid	Biceps	Triceps	Wrist	Finger ABd	   R	4	4+	4+	4+	4-		4- 	  L	5	5	5	5	5		5    	H-Flex	H-Ext	H-ABd	H-ADd	K-Flex	K-Ext	D-Flex	P-Flex  R	5	5	5	5	5	5	5	5 	   L	5	5	5	5	5	5	5	5	     Sensation: Reduced to LT & PP upon right shoulder, arm, hand compared to left side. Proprioception & vibration intact b/l upper and lower extremities.    Cortical: Extinction on DSS (neglect): none    Reflexes: no clonus upon rapid dorsiflexion of feet b/l              Biceps(C5)       BR(C6)     Triceps(C7)               Patellar(L4)    Achilles(S1)    Plantar Resp  R	3	          3             3		        2		    2		Down   L	2	          2	             2		        2		    2		Down     Coordination: undershooting of right arm upon FTN testing    Gait:  No postural instability. Normal stance and tandem gait.     LABORATORY:  CBC                       13.4   8.67  )-----------( 227      ( 13 Aug 2020 21:46 )             42.3     Chem 08-13    137  |  101  |  14  ----------------------------<  92  3.8   |  25  |  0.59    Ca    9.3      13 Aug 2020 21:46  Phos  3.3     08-13  Mg     2.0     08-13    TPro  7.6  /  Alb  4.6  /  TBili  0.2  /  DBili  x   /  AST  22  /  ALT  25  /  AlkPhos  131<H>  08-13    LFTs LIVER FUNCTIONS - ( 13 Aug 2020 21:46 )  Alb: 4.6 g/dL / Pro: 7.6 g/dL / ALK PHOS: 131 U/L / ALT: 25 U/L / AST: 22 U/L / GGT: x           Coagulopathy   Lipid Panel   A1c   Cardiac enzymes     U/A   CSF  Immunological  Other    STUDIES & IMAGING:  Studies (EKG, EEG, EMG, etc):     Radiology (XR, CT, MR, U/S, TTE/SHUN):

## 2020-08-13 NOTE — ED ADULT NURSE NOTE - ED STAT RN HANDOFF DETAILS
Handoff report provided to observation nurse Rachel POOL. Understands pmh, medications given and plan of care for patient. Patient in stable condition, vital signs updated, has no complaints at this time and has been updated on care plan. Explained to patient that new nurse is taking over, pt verbalized understanding.

## 2020-08-13 NOTE — ED PROVIDER NOTE - PHYSICAL EXAMINATION
GENERAL: Awake, alert, NAD  HEENT: NC/AT, moist mucous membranes, PERRL, EOMI  LUNGS: CTAB, no wheezes or crackles   CARDIAC: RRR, no m/r/g  ABDOMEN: Soft, normal BS, non tender, non distended, no rebound, no guarding  BACK: No midline spinal tenderness, no CVA tenderness  EXT: No edema, no calf tenderness, 2+ DP pulses bilaterally, no deformities.  NEURO: A&Ox3. Moving all extremities. 5/5 strength upper and lower extremities bilaterally, sensation intact.  SKIN: Warm and dry. No rash.  PSYCH: Normal affect. GENERAL: Awake, alert, NAD  HEENT: NC/AT, moist mucous membranes, PERRL, EOMI  LUNGS: CTAB, no wheezes or crackles   CARDIAC: RRR, no m/r/g  ABDOMEN: Soft, normal BS, non tender, non distended, no rebound, no guarding  BACK: No midline spinal tenderness, no CVA tenderness  EXT: No edema, no calf tenderness, 2+ DP pulses bilaterally, no deformities.  NEURO: A&Ox3. Moving all extremities. 4.5/5  strength bilaterally, sensation intact.   SKIN: Warm and dry. No rash.  PSYCH: Normal affect.

## 2020-08-13 NOTE — ED ADULT NURSE NOTE - CHPI ED NUR SYMPTOMS NEG
no fever/no nausea/no change in level of consciousness/no confusion/no vomiting/no blurred vision/no dizziness/no weakness/no loss of consciousness

## 2020-08-13 NOTE — ED PROVIDER NOTE - ATTENDING CONTRIBUTION TO CARE
Attending MD Chung:  I personally have seen and examined this patient.  Resident note reviewed and agree on plan of care and except where noted.  See HPI, PE, and MDM for details.       25F with 3 days of b/l hand  weakness, b/l UE paresthesias and numbness and blurred vision. Exam with very subtle b/l  strength weakness, otherwise unrevealing. Primary concern would be for MS affected cervical spinal cord but must rule out compressive lesion of C spine so will order MR C spine and brain, neurology consultation

## 2020-08-13 NOTE — ED PROVIDER NOTE - PROGRESS NOTE DETAILS
Ina Clinton PGY-1: Spoke with neurology resident. Suggests MRI brain and cervical spine with and without contrast. Will evaluate patient. Ina Clinton PGY-1: Contacted CDU for admission. No answer. Will call back. Ina Clinton PGY-1: CDU accepted patient pending COVID. Neurology at bedside. Recommend U/A, CXR, possibly steroids.

## 2020-08-13 NOTE — ED PROVIDER NOTE - OBJECTIVE STATEMENT
25 year old female with no PMH presenting with 3-4 days of upper extremity paresthesias associated with weakness and decreased  strength. States both cheeks also feel numb. Patient also endorses intermittent blurry vision. Referred from urgent care center for evaluation. Denies headaches, LE weakness, syncope, gait instability, CP, SOB, N/V, fevers.

## 2020-08-13 NOTE — CONSULT NOTE ADULT - ASSESSMENT
Assessment: 24yo RH woman with no pmh presenting with progressive tingling in b/l face, shoulders, arms, fingertips with some right-handed weakness with Neurology consult for concern for MS. Neurological examination demonstrates mild weakness in right arm with hemisensory loss on right arm and hyperreflexia in right arm. Pending neuroimaging.    Impression: b/l tingling in shoulders, arms, fingertips with right arm weakness and hyperreflexia concerning for the following ddx: Multiple Sclerosis, cervical myelopathy, transverse myelitis, AIDP, complex migraine, stroke (low suspicion)     Recommendations:  [ ] MRI head w/w/o contrast  [ ] MR c spine w/w/o contrast  [ ] baseline NIF & VC  [ ] to review with Neurology Attending if to give steroids    -Management & disposition to be discussed with Neurology Attending Dr. Lozoya Assessment: 26yo RH woman with no pmh presenting with progressive tingling in b/l face, shoulders, arms, fingertips with some right-handed weakness with Neurology consult for concern for MS. Neurological examination demonstrates mild weakness in right arm with hemisensory loss on right arm and hyperreflexia in right arm. Pending neuroimaging.    Impression: b/l tingling in shoulders, arms, fingertips with right arm weakness and hyperreflexia concerning for the following ddx: Multiple Sclerosis, NMO, optic neuritis, cervical myelopathy, transverse myelitis, AIDP, complex migraine, stroke (low suspicion)     Recommendations:  [ ] MRI head w/w/o contrast  [ ] MR c spine w/w/o contrast  [ ] baseline NIF & VC  [ ] to review with Neurology Attending if to give steroids    -Management & disposition to be discussed with Neurology Attending Dr. Lozoya Assessment: 26yo RH woman with no pmh presenting with progressive tingling in b/l face, shoulders, arms, fingertips with some right-handed weakness with Neurology consult for concern for MS. Neurological examination demonstrates mild weakness in right arm with hemisensory loss on right arm and hyperreflexia in right arm. Pending neuroimaging.    Impression: b/l tingling in shoulders, arms, fingertips with right arm weakness and hyperreflexia concerning for the following ddx: Multiple Sclerosis, NMO, optic neuritis, cervical myelopathy, transverse myelitis, AIDP, complex migraine, stroke (low suspicion)     Recommendations:  [ ] MRI head w/w/o contrast  [ ] MR c spine w/w/o contrast  [ ] baseline NIF & VC  [ ] NMO serum antibody, CBC, CMP, a1c, methylmalonic acid, homocysteine, LORRIE, RF, MOG serum antibody  [ ] contingent upon MRI to consider steroids    -Management & disposition to be discussed with Neurology Attending Dr. Lozoya Assessment: 24yo RH woman with no pmh presenting with progressive tingling in b/l face, shoulders, arms, fingertips with some right-handed weakness with Neurology consult for concern for MS. Neurological examination demonstrates mild weakness in right arm with hemisensory loss on right arm and hyperreflexia in right arm. Pending neuroimaging.    Impression: b/l tingling in shoulders, arms, fingertips with right arm weakness and hyperreflexia concerning for the following ddx: Multiple Sclerosis, NMO, optic neuritis, cervical myelopathy, transverse myelitis, GCA, AIDP, complex migraine, stroke (low suspicion)     Recommendations:  [ ] MRI head w/w/o contrast  [ ] MR c spine w/w/o contrast  [ ] baseline NIF & VC  [ ] NMO serum antibody, CBC, CMP, a1c, methylmalonic acid, homocysteine, LORRIE, RF, MOG serum antibody, ESR, CRP  [ ] contingent upon MRI to consider steroids    -Management & disposition to be discussed with Neurology Attending Dr. Lozoya

## 2020-08-13 NOTE — CONSULT NOTE ADULT - ATTENDING COMMENTS
24yo RH woman with no pmh presenting with progressive tingling in b/l face, shoulders, arms, fingertips with some right-handed weakness. Headache this morning resolved but still has bilat hand tingling. MRI head and neck negative. No findings on exam other than positive Tinnel's at the ulnar groove bilat and of the transverse ligament of the left. 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 Recommended wrist splints at night. Follow up with neurology as outpatient.

## 2020-08-14 VITALS
OXYGEN SATURATION: 99 % | HEART RATE: 85 BPM | DIASTOLIC BLOOD PRESSURE: 65 MMHG | SYSTOLIC BLOOD PRESSURE: 99 MMHG | TEMPERATURE: 98 F | RESPIRATION RATE: 18 BRPM

## 2020-08-14 LAB — SARS-COV-2 RNA SPEC QL NAA+PROBE: SIGNIFICANT CHANGE UP

## 2020-08-14 PROCEDURE — 72156 MRI NECK SPINE W/O & W/DYE: CPT | Mod: 26

## 2020-08-14 PROCEDURE — G0378: CPT

## 2020-08-14 PROCEDURE — 84100 ASSAY OF PHOSPHORUS: CPT

## 2020-08-14 PROCEDURE — 71046 X-RAY EXAM CHEST 2 VIEWS: CPT

## 2020-08-14 PROCEDURE — 83735 ASSAY OF MAGNESIUM: CPT

## 2020-08-14 PROCEDURE — A9585: CPT

## 2020-08-14 PROCEDURE — 70553 MRI BRAIN STEM W/O & W/DYE: CPT

## 2020-08-14 PROCEDURE — 72156 MRI NECK SPINE W/O & W/DYE: CPT

## 2020-08-14 PROCEDURE — 80053 COMPREHEN METABOLIC PANEL: CPT

## 2020-08-14 PROCEDURE — 84702 CHORIONIC GONADOTROPIN TEST: CPT

## 2020-08-14 PROCEDURE — 70553 MRI BRAIN STEM W/O & W/DYE: CPT | Mod: 26

## 2020-08-14 PROCEDURE — 99217: CPT

## 2020-08-14 PROCEDURE — 85027 COMPLETE CBC AUTOMATED: CPT

## 2020-08-14 PROCEDURE — 99284 EMERGENCY DEPT VISIT MOD MDM: CPT | Mod: 25

## 2020-08-14 RX ADMIN — Medication 975 MILLIGRAM(S): at 00:51

## 2020-08-14 NOTE — ED CDU PROVIDER SUBSEQUENT DAY NOTE - HISTORY
Pt seen at bedside. Pt in NAD, comfortable. VS stable from last reading. Pt has no new complaints at this time, still noting numbness/paresthesias to b/l UE and face. Neurologic exam unchanged from prior. Will continue to monitor. -Deondre Miller PA-C

## 2020-08-14 NOTE — ED CDU PROVIDER DISPOSITION NOTE - PATIENT PORTAL LINK FT
You can access the FollowMyHealth Patient Portal offered by [facility] by registering at the following website: [email or web address]. By joining [facility]’s FollowMyHealth portal, you will also be able to view your health information using other applications (apps) compatible with our system.

## 2020-08-14 NOTE — ED CDU PROVIDER DISPOSITION NOTE - CLINICAL COURSE
26 y/o F with no PMH presenting with 3-4 days of upper extremity paresthesias associated with weakness and decreased  strength. States also feeling numbness to b/l cheeks R>L. Patient also endorses intermittent blurry vision. Referred from urgent care center for evaluation. Denies headaches, LE weakness, syncope, gait instability, CP, SOB, N/V, fevers, prior episodes, fh/o MS.  ED Course: labs non-actionable, HCG negative, COVID PCR negative, CXR without acute pathology. Pt evaluated by neuro, recommend CDU for MRI head an C spine, no recommendations for steroids in ED at time of eval.  CDU Course: MRI showed___. 24 y/o F with no PMH presenting with 3-4 days of upper extremity paresthesias associated with weakness and decreased  strength. States also feeling numbness to b/l cheeks R>L. Patient also endorses intermittent blurry vision. Referred from urgent care center for evaluation. Denies headaches, LE weakness, syncope, gait instability, CP, SOB, N/V, fevers, prior episodes, fh/o MS.  ED Course: labs non-actionable, HCG negative, COVID PCR negative, CXR without acute pathology. Pt evaluated by neuro, recommend CDU for MRI head an C spine, no recommendations for steroids in ED at time of eval.  CDU Course:  MRI negative for acute pathology.  Patient evaluated by neurology who is recommending outpatient follow up.  Strict return precautions provided

## 2020-08-14 NOTE — ED CDU PROVIDER INITIAL DAY NOTE - ATTENDING CONTRIBUTION TO CARE
Attending MD Chung:   I personally have seen and examined this patient.  Physician assistant note reviewed and agree on plan of care and except where noted.  See HPI, PE, and MDM for details.

## 2020-08-14 NOTE — ED CDU PROVIDER DISPOSITION NOTE - NSFOLLOWUPINSTRUCTIONS_ED_ALL_ED_FT
1) Follow-up with your primary medical doctor in 2-3 days.       Additionally follow-up with neurology in 2-3 days, you may follow-up with the neurology clinic [phone].    2) Take all medication as directed.    3) Rest and drink plenty of fluids.    4) Return to the ER if symptoms persist or worsen, or if you experience weakness, numbness, difficulty speaking/swallowing/walking, dizziness, lightheadedness, passing out (losing consciousness), vomiting, severe headache, change in vision, or if any other concerning symptoms. 1) Follow-up with your primary medical doctor in 2-3 days.       Additionally follow-up with neurology in 2-3 days, you may follow-up with the neurology clinic [phone]    2) Continue all home medication as directed.    3) Rest and drink plenty of fluids.    4) Return to the ER if symptoms persist or worsen, or if you experience weakness, numbness, difficulty speaking/swallowing/walking, dizziness, lightheadedness, passing out (losing consciousness), vomiting, severe headache, change in vision, or if any other concerning symptoms.

## 2020-08-14 NOTE — ED CDU PROVIDER INITIAL DAY NOTE - PHYSICAL EXAMINATION
GEN: Pt in NAD, A&O x3.  PSYCH: Affect appropriate.  EYES: Sclera white w/o injection, PERRL, EOMI.   ENT: Head NCAT. MMM. Neck supple FROM.  RESP: CTA b/l, no wheezes, rales, or rhonchi.   CARDIAC: RRR, clear distinct S1, S2, no appreciable murmurs.  ABD: Abdomen soft, non-tender. No CVAT b/l.  VASC: 2+ radial and dorsalis pedis pulses b/l. No edema or calf tenderness.  NEURO: Decreased sensation R side of the face, remainder of CN2-12 grossly intact. Decreased sensation RUE distal to deltoid region, normal and equal sensation b/l LE. 5/5 strength UE and LE b/l. Pronator drift negative. Normal gait and gross cerebellar function.   SKIN: No rashes on the trunk.

## 2020-08-14 NOTE — ED CDU PROVIDER DISPOSITION NOTE - ATTENDING CONTRIBUTION TO CARE
I have personally performed a face to face medical and diagnostic evaluation of the patient. I have discussed with and reviewed the ACP's note and agree with the History, ROS, Physical Exam and MDM unless otherwise indicated. A brief summary of my personal evaluation and impression can be found below.    24 y/o F with no PMH presenting with 3-4 days of upper extremity paresthesias associated with weakness and decreased  strength. States also feeling numbness to b/l cheeks R>L. Patient also endorses intermittent blurry vision. Referred from urgent care center for evaluation. Denies headaches, LE weakness, syncope, gait instability, CP, SOB, N/V, fevers, prior episodes, fh/o MS.  ED Course: labs non-actionable, HCG negative, COVID PCR negative, CXR without acute pathology. Pt evaluated by neuro, recommend CDU for MRI head an C spine, no recommendations for steroids in ED at time of eval.    No acute events overnight, on interval reassessment pt still notes mild b/l UE numbness and tingling, no focal weakness.     Plan/MDM: pending MRI, serial exams, neuro reeval, dispo therafter.

## 2020-08-14 NOTE — ED CDU PROVIDER SUBSEQUENT DAY NOTE - PHYSICAL EXAMINATION
GEN: Pt in NAD, A&O x3.  PSYCH: Affect appropriate.  EYES: Sclera white w/o injection, PERRL, EOMI.   ENT: Head NCAT. MMM. Neck supple FROM.  RESP: CTA b/l  CARDIAC: RRR, clear distinct S1, S2, no appreciable murmurs.  ABD: Abdomen soft, non-tender.  VASC: No edema or calf tenderness.  NEURO: Decreased sensation R side of the face, remainder of CN2-12 grossly intact. Decreased sensation RUE distal to deltoid region, normal and equal sensation b/l LE. 5/5 strength UE and LE b/l. Pronator drift negative. Normal gait and gross cerebellar function.   SKIN: No rashes on the trunk.

## 2020-08-14 NOTE — ED CDU PROVIDER SUBSEQUENT DAY NOTE - PROGRESS NOTE DETAILS
Pt in NAD, VSS, pt has no new complaints at this time. Neurologic exam unchanged from previous exam. Will continue to monitor. Patient seen at bedside in NAD.  VSS.  Patient resting comfortably. Patient states numbness is unchanged.  Awaiting MRI and neuro recs. -Tin Rosen PA-C Patient seen at bedside in NAD.  VSS.  Patient resting comfortably without complaints. MRI negative for acute pathology.  Patient evaluated by neurology who is recommending outpatient follow up.  Strict return precautions provided.  -Tin Rosen PA-C

## 2020-08-14 NOTE — ED ADULT NURSE REASSESSMENT NOTE - NS ED NURSE REASSESS COMMENT FT1
Received report from Mary Owusu. Pt at XRAY at time of report. Upon return, pt to be updated on plan of care and provide PO Tylenol.
Report taken from Rachel POOL. States patient had a good night with no complaints. Will continue to monitor.
Pt received from YRN Galloway. Pt oriented to CDU & plan of care was discussed. Pt AO4. Neuro check intact except pt endorses b/l arm numbness & tingling L>R and b/l face numbness & tingling R>L. Pt endorses headache but states it started after covid test. Pt denies any lightheadedness/ dizziness, double/ blurry vision. No other deficits noted. Safety & comfort measures maintained. Call bell in reach. Will continue to monitor.

## 2020-08-14 NOTE — ED CDU PROVIDER INITIAL DAY NOTE - OBJECTIVE STATEMENT
26 y/o F with no PMH presenting with 3-4 days of upper extremity paresthesias associated with weakness and decreased  strength. States also feeling numbness to b/l cheeks R>L. Patient also endorses intermittent blurry vision. Referred from urgent care center for evaluation. Denies headaches, LE weakness, syncope, gait instability, CP, SOB, N/V, fevers, prior episodes, fh/o MS.  ED Course: labs non-actionable, HCG negative, COVID PCR negative, CXR without acute pathology. Pt evaluated by neuro, recommend CDU for MRI head an C spine, no recommendations for steroids in ED at time of eval.

## 2020-12-16 PROBLEM — J01.90 ACUTE NON-RECURRENT SINUSITIS, UNSPECIFIED LOCATION: Status: RESOLVED | Noted: 2017-06-16 | Resolved: 2020-12-16

## 2022-01-01 NOTE — ED CDU PROVIDER SUBSEQUENT DAY NOTE - NS ED MD CDU HISTORY DATE TIME DT
14-Aug-2020 01:55 Probably anemia of chronic blood loss.  Will monitor HGB.  Stable at the moment.    Lab Results   Component Value Date    HGB 8.4 (L) 12/31/2021

## 2022-02-06 ENCOUNTER — APPOINTMENT (OUTPATIENT)
Dept: OBGYN | Facility: CLINIC | Age: 27
End: 2022-02-06
Payer: COMMERCIAL

## 2022-02-06 VITALS
SYSTOLIC BLOOD PRESSURE: 119 MMHG | TEMPERATURE: 96.9 F | DIASTOLIC BLOOD PRESSURE: 83 MMHG | BODY MASS INDEX: 28.32 KG/M2 | HEART RATE: 86 BPM | WEIGHT: 150 LBS | HEIGHT: 61 IN

## 2022-02-06 DIAGNOSIS — Z01.419 ENCOUNTER FOR GYNECOLOGICAL EXAMINATION (GENERAL) (ROUTINE) W/OUT ABNORMAL FINDINGS: ICD-10-CM

## 2022-02-06 PROCEDURE — 99395 PREV VISIT EST AGE 18-39: CPT

## 2022-02-10 LAB — CYTOLOGY CVX/VAG DOC THIN PREP: NORMAL

## 2022-06-20 DIAGNOSIS — N92.6 IRREGULAR MENSTRUATION, UNSPECIFIED: ICD-10-CM

## 2022-10-19 NOTE — ED ADULT TRIAGE NOTE - CCCP TRG CHIEF CMPLNT
numbness Low Dose Naltrexone Pregnancy And Lactation Text: Naltrexone is pregnancy category C.  There have been no adequate and well-controlled studies in pregnant women.  It should be used in pregnancy only if the potential benefit justifies the potential risk to the fetus.   Limited data indicates that naltrexone is minimally excreted into breastmilk.

## 2022-12-16 NOTE — OB PROVIDER H&P - NSPREVIOUSLIVEBIRTHSNOWDEAD_OBGYN_ALL_OB_NU
Detail Level: Detailed Quality 130: Documentation Of Current Medications In The Medical Record: Current Medications Documented 0

## 2024-04-25 ENCOUNTER — APPOINTMENT (OUTPATIENT)
Dept: OBGYN | Facility: CLINIC | Age: 29
End: 2024-04-25
Payer: COMMERCIAL

## 2024-04-25 ENCOUNTER — ASOB RESULT (OUTPATIENT)
Age: 29
End: 2024-04-25

## 2024-04-25 VITALS
HEART RATE: 89 BPM | HEIGHT: 61 IN | WEIGHT: 154.13 LBS | BODY MASS INDEX: 29.1 KG/M2 | SYSTOLIC BLOOD PRESSURE: 110 MMHG | DIASTOLIC BLOOD PRESSURE: 79 MMHG

## 2024-04-25 DIAGNOSIS — O21.9 VOMITING OF PREGNANCY, UNSPECIFIED: ICD-10-CM

## 2024-04-25 PROCEDURE — 76817 TRANSVAGINAL US OBSTETRIC: CPT

## 2024-04-25 PROCEDURE — 99459 PELVIC EXAMINATION: CPT

## 2024-04-25 PROCEDURE — 99214 OFFICE O/P EST MOD 30 MIN: CPT

## 2024-04-25 RX ORDER — ONDANSETRON 4 MG/1
4 TABLET ORAL EVERY 6 HOURS
Qty: 1 | Refills: 0 | Status: ACTIVE | COMMUNITY
Start: 2024-04-25 | End: 1900-01-01

## 2024-04-25 RX ORDER — URINE ACETONE TEST STRIPS
STRIP MISCELLANEOUS
Qty: 2 | Refills: 2 | Status: COMPLETED | COMMUNITY
Start: 2019-10-09 | End: 2024-04-25

## 2024-04-25 RX ORDER — ASPIRIN 81 MG/1
81 TABLET, CHEWABLE ORAL DAILY
Qty: 90 | Refills: 1 | Status: COMPLETED | COMMUNITY
Start: 2019-04-25 | End: 2024-04-25

## 2024-04-25 RX ORDER — DOXYLAMINE SUCCINATE AND PYRIDOXINE HYDROCHLORIDE 10; 10 MG/1; MG/1
10-10 TABLET, DELAYED RELEASE ORAL
Qty: 60 | Refills: 2 | Status: COMPLETED | COMMUNITY
Start: 2019-04-25 | End: 2024-04-25

## 2024-04-25 RX ORDER — BLOOD SUGAR DIAGNOSTIC
STRIP MISCELLANEOUS
Qty: 150 | Refills: 2 | Status: COMPLETED | COMMUNITY
Start: 2019-10-09 | End: 2024-04-25

## 2024-04-25 RX ORDER — LANCETS 33 GAUGE
EACH MISCELLANEOUS
Qty: 150 | Refills: 2 | Status: COMPLETED | COMMUNITY
Start: 2019-10-09 | End: 2024-04-25

## 2024-04-25 RX ORDER — FOLIC ACID/MULTIVIT,IRON,MINER 0.4MG-18MG
TABLET ORAL
Refills: 0 | Status: ACTIVE | COMMUNITY

## 2024-04-25 RX ORDER — DOXYLAMINE SUCCINATE AND PYRIDOXINE HYDROCHLORIDE 10; 10 MG/1; MG/1
10-10 TABLET, DELAYED RELEASE ORAL
Qty: 60 | Refills: 1 | Status: COMPLETED | COMMUNITY
Start: 2017-05-19 | End: 2024-04-25

## 2024-04-25 RX ORDER — FOLIC ACID 1 MG/1
1 TABLET ORAL DAILY
Qty: 30 | Refills: 0 | Status: COMPLETED | COMMUNITY
Start: 2019-04-25 | End: 2024-04-25

## 2024-04-27 NOTE — HISTORY OF PRESENT ILLNESS
[FreeTextEntry1] : 28 year year old  presenting for eval of delayed menses. Reports significant nausea/vomiting, though still able to keep food down. +breast tenderness. No abdominal cramping. No vaginal bleeding. +constipation. LMP 3/12/24

## 2024-04-27 NOTE — PHYSICAL EXAM
[Chaperone Present] : A chaperone was present in the examining room during all aspects of the physical examination [[id number]] : A chaperone was present during the pelvic exam. [Appropriately responsive] : appropriately responsive [Alert] : alert [No Acute Distress] : no acute distress [No Lymphadenopathy] : no lymphadenopathy [Regular Rate Rhythm] : regular rate rhythm [No Murmurs] : no murmurs [Clear to Auscultation B/L] : clear to auscultation bilaterally [Soft] : soft [Non-tender] : non-tender [Non-distended] : non-distended [No HSM] : No HSM [No Lesions] : no lesions [No Mass] : no mass [Oriented x3] : oriented x3 [Labia Majora] : normal [Labia Minora] : normal [Normal] : normal [Uterine Adnexae] : normal [FreeTextEntry2] :  TANYA Wright

## 2024-04-27 NOTE — PLAN
[FreeTextEntry1] : 28 year yo  here for missed menses   pelvic sono shows SLIUP @ 6 weeks 1 d by LMP c/w early ultrasound.   Routine new OB labs and counseling. Welcome letter given.   Discussed screening vs. diagnostic/invasive testing for chromosomal abnormalities. Declines amnio/[facility]. Will schedule NT, and draw NIPS at next visit.

## 2024-05-23 ENCOUNTER — ASOB RESULT (OUTPATIENT)
Age: 29
End: 2024-05-23

## 2024-05-23 ENCOUNTER — APPOINTMENT (OUTPATIENT)
Dept: OBGYN | Facility: CLINIC | Age: 29
End: 2024-05-23
Payer: COMMERCIAL

## 2024-05-23 VITALS
HEIGHT: 61 IN | DIASTOLIC BLOOD PRESSURE: 78 MMHG | HEART RATE: 82 BPM | SYSTOLIC BLOOD PRESSURE: 111 MMHG | BODY MASS INDEX: 29.83 KG/M2 | WEIGHT: 158 LBS

## 2024-05-23 DIAGNOSIS — Z34.91 ENCOUNTER FOR SUPERVISION OF NORMAL PREGNANCY, UNSPECIFIED, FIRST TRIMESTER: ICD-10-CM

## 2024-05-23 PROCEDURE — 76816 OB US FOLLOW-UP PER FETUS: CPT

## 2024-05-23 PROCEDURE — 0500F INITIAL PRENATAL CARE VISIT: CPT

## 2024-05-23 RX ORDER — ASPIRIN 81 MG/1
81 TABLET, COATED ORAL
Qty: 135 | Refills: 0 | Status: ACTIVE | COMMUNITY
Start: 2024-05-23 | End: 1900-01-01

## 2024-05-25 LAB
ABO + RH PNL BLD: NORMAL
ALBUMIN SERPL ELPH-MCNC: 4.3 G/DL
ALP BLD-CCNC: 93 U/L
ALT SERPL-CCNC: 24 U/L
ANION GAP SERPL CALC-SCNC: 13 MMOL/L
AST SERPL-CCNC: 21 U/L
B19V IGG SER QL IA: 6.23 INDEX
B19V IGG+IGM SER-IMP: NORMAL
B19V IGG+IGM SER-IMP: POSITIVE
B19V IGM FLD-ACNC: 0.22 INDEX
B19V IGM SER-ACNC: NEGATIVE
BASOPHILS # BLD AUTO: 0.05 K/UL
BASOPHILS NFR BLD AUTO: 0.7 %
BILIRUB SERPL-MCNC: <0.2 MG/DL
BLD GP AB SCN SERPL QL: NORMAL
BUN SERPL-MCNC: 7 MG/DL
C TRACH RRNA SPEC QL NAA+PROBE: NOT DETECTED
CALCIUM SERPL-MCNC: 9.4 MG/DL
CHLORIDE SERPL-SCNC: 97 MMOL/L
CMV IGG SERPL QL: 9.3 U/ML
CMV IGG SERPL-IMP: POSITIVE
CMV IGM SERPL QL: <8 AU/ML
CMV IGM SERPL QL: NEGATIVE
CO2 SERPL-SCNC: 24 MMOL/L
CREAT SERPL-MCNC: 0.48 MG/DL
EGFR: 130 ML/MIN/1.73M2
EOSINOPHIL # BLD AUTO: 0.14 K/UL
EOSINOPHIL NFR BLD AUTO: 1.9 %
ESTIMATED AVERAGE GLUCOSE: 105 MG/DL
GLUCOSE SERPL-MCNC: 77 MG/DL
HBA1C MFR BLD HPLC: 5.3 %
HBV SURFACE AG SER QL: NONREACTIVE
HCT VFR BLD CALC: 37.9 %
HCV AB SER QL: NONREACTIVE
HCV S/CO RATIO: 0.3 S/CO
HGB BLD-MCNC: 12.3 G/DL
HIV1+2 AB SPEC QL IA.RAPID: NONREACTIVE
IMM GRANULOCYTES NFR BLD AUTO: 0.3 %
LEAD BLD-MCNC: <1 UG/DL
LYMPHOCYTES # BLD AUTO: 1.61 K/UL
LYMPHOCYTES NFR BLD AUTO: 22.4 %
MAN DIFF?: NORMAL
MCHC RBC-ENTMCNC: 28.7 PG
MCHC RBC-ENTMCNC: 32.5 GM/DL
MCV RBC AUTO: 88.6 FL
MEV IGG FLD QL IA: >300 AU/ML
MEV IGG+IGM SER-IMP: POSITIVE
MONOCYTES # BLD AUTO: 0.7 K/UL
MONOCYTES NFR BLD AUTO: 9.7 %
N GONORRHOEA RRNA SPEC QL NAA+PROBE: NOT DETECTED
NEUTROPHILS # BLD AUTO: 4.68 K/UL
NEUTROPHILS NFR BLD AUTO: 65 %
PLATELET # BLD AUTO: 265 K/UL
POTASSIUM SERPL-SCNC: 3.5 MMOL/L
PROT SERPL-MCNC: 7.3 G/DL
RBC # BLD: 4.28 M/UL
RBC # FLD: 13.6 %
RUBV IGG FLD-ACNC: 10.5 INDEX
RUBV IGG SER-IMP: POSITIVE
SODIUM SERPL-SCNC: 134 MMOL/L
SOURCE AMPLIFICATION: NORMAL
T GONDII AB SER-IMP: NEGATIVE
T GONDII AB SER-IMP: NEGATIVE
T GONDII IGG SER QL: <3 IU/ML
T GONDII IGM SER QL: <3 AU/ML
T PALLIDUM AB SER QL IA: NEGATIVE
TSH SERPL-ACNC: 0.37 UIU/ML
VZV AB TITR SER: POSITIVE
VZV IGG SER IF-ACNC: 2827 INDEX
WBC # FLD AUTO: 7.2 K/UL

## 2024-05-26 LAB — BACTERIA UR CULT: NORMAL

## 2024-05-29 LAB
M TB IFN-G BLD-IMP: NEGATIVE
QUANTIFERON TB PLUS MITOGEN MINUS NIL: 6.67 IU/ML
QUANTIFERON TB PLUS NIL: 0.05 IU/ML
QUANTIFERON TB PLUS TB1 MINUS NIL: 0 IU/ML
QUANTIFERON TB PLUS TB2 MINUS NIL: 0 IU/ML

## 2024-06-06 RX ORDER — DOXYLAMINE SUCCINATE AND PYRIDOXINE HYDROCHLORIDE 10; 10 MG/1; MG/1
10-10 TABLET, DELAYED RELEASE ORAL
Qty: 45 | Refills: 1 | Status: ACTIVE | COMMUNITY
Start: 2024-04-25 | End: 1900-01-01

## 2024-06-09 ENCOUNTER — LABORATORY RESULT (OUTPATIENT)
Age: 29
End: 2024-06-09

## 2024-06-10 ENCOUNTER — ASOB RESULT (OUTPATIENT)
Age: 29
End: 2024-06-10

## 2024-06-10 ENCOUNTER — APPOINTMENT (OUTPATIENT)
Dept: ANTEPARTUM | Facility: CLINIC | Age: 29
End: 2024-06-10
Payer: COMMERCIAL

## 2024-06-10 PROCEDURE — 36416 COLLJ CAPILLARY BLOOD SPEC: CPT

## 2024-06-10 PROCEDURE — 76813 OB US NUCHAL MEAS 1 GEST: CPT

## 2024-06-12 ENCOUNTER — APPOINTMENT (OUTPATIENT)
Dept: OBGYN | Facility: CLINIC | Age: 29
End: 2024-06-12

## 2024-06-12 ENCOUNTER — APPOINTMENT (OUTPATIENT)
Dept: ANTEPARTUM | Facility: CLINIC | Age: 29
End: 2024-06-12

## 2024-07-02 ENCOUNTER — NON-APPOINTMENT (OUTPATIENT)
Age: 29
End: 2024-07-02

## 2024-07-03 ENCOUNTER — APPOINTMENT (OUTPATIENT)
Dept: OBGYN | Facility: CLINIC | Age: 29
End: 2024-07-03
Payer: COMMERCIAL

## 2024-07-03 VITALS
BODY MASS INDEX: 29.07 KG/M2 | HEIGHT: 61 IN | SYSTOLIC BLOOD PRESSURE: 116 MMHG | DIASTOLIC BLOOD PRESSURE: 71 MMHG | HEART RATE: 94 BPM | WEIGHT: 154 LBS

## 2024-07-03 PROCEDURE — 0502F SUBSEQUENT PRENATAL CARE: CPT

## 2024-07-06 LAB
AF-AFP DISCLAIMER: NORMAL
AF-AFP MOM: 1.01
AFP CONCENTRATION: 30.33 NG/ML
AFP INTERPRETATION: NORMAL
AFP MOM CUT-OFF: 2.5
AFP PERCENTILE: 50.7
AFP SCREENING RESULT: NORMAL
AFTER SCREENING RISK OPEN SPINA BIFIDA: NORMAL
BEFORE SCREENING RISK OPEN SPINA BIFIDA: NORMAL
EXTREME ANALYTE ALERT: NO
GESTATIONAL  AGE: NORMAL
MATERNAL WGT: 154
RACE/ETHNICITY: NORMAL

## 2024-08-05 ENCOUNTER — ASOB RESULT (OUTPATIENT)
Age: 29
End: 2024-08-05

## 2024-08-05 ENCOUNTER — APPOINTMENT (OUTPATIENT)
Dept: ANTEPARTUM | Facility: CLINIC | Age: 29
End: 2024-08-05

## 2024-08-05 PROCEDURE — 76811 OB US DETAILED SNGL FETUS: CPT

## 2024-08-08 ENCOUNTER — NON-APPOINTMENT (OUTPATIENT)
Age: 29
End: 2024-08-08

## 2024-08-08 ENCOUNTER — APPOINTMENT (OUTPATIENT)
Dept: OBGYN | Facility: CLINIC | Age: 29
End: 2024-08-08

## 2024-08-14 ENCOUNTER — TRANSCRIPTION ENCOUNTER (OUTPATIENT)
Age: 29
End: 2024-08-14

## 2024-08-22 ENCOUNTER — APPOINTMENT (OUTPATIENT)
Dept: PEDIATRIC CARDIOLOGY | Facility: CLINIC | Age: 29
End: 2024-08-22
Payer: COMMERCIAL

## 2024-08-22 PROCEDURE — 99202 OFFICE O/P NEW SF 15 MIN: CPT

## 2024-08-22 PROCEDURE — 76820 UMBILICAL ARTERY ECHO: CPT | Mod: 26

## 2024-08-22 PROCEDURE — 76827 ECHO EXAM OF FETAL HEART: CPT

## 2024-08-22 PROCEDURE — 76825 ECHO EXAM OF FETAL HEART: CPT

## 2024-08-22 PROCEDURE — 76821 MIDDLE CEREBRAL ARTERY ECHO: CPT | Mod: 26

## 2024-09-05 ENCOUNTER — NON-APPOINTMENT (OUTPATIENT)
Age: 29
End: 2024-09-05

## 2024-09-05 ENCOUNTER — APPOINTMENT (OUTPATIENT)
Dept: OBGYN | Facility: CLINIC | Age: 29
End: 2024-09-05
Payer: COMMERCIAL

## 2024-09-05 VITALS
HEART RATE: 93 BPM | BODY MASS INDEX: 30.4 KG/M2 | HEIGHT: 61 IN | SYSTOLIC BLOOD PRESSURE: 112 MMHG | WEIGHT: 161 LBS | DIASTOLIC BLOOD PRESSURE: 76 MMHG

## 2024-09-05 PROCEDURE — 0502F SUBSEQUENT PRENATAL CARE: CPT

## 2024-09-09 ENCOUNTER — APPOINTMENT (OUTPATIENT)
Dept: ANTEPARTUM | Facility: CLINIC | Age: 29
End: 2024-09-09
Payer: COMMERCIAL

## 2024-09-09 ENCOUNTER — ASOB RESULT (OUTPATIENT)
Age: 29
End: 2024-09-09

## 2024-09-09 PROCEDURE — 76816 OB US FOLLOW-UP PER FETUS: CPT

## 2024-09-10 RX ORDER — BLOOD-GLUCOSE METER
KIT MISCELLANEOUS
Qty: 4 | Refills: 1 | Status: ACTIVE | COMMUNITY
Start: 2024-09-10 | End: 1900-01-01

## 2024-09-10 RX ORDER — CHOLECALCIFEROL (VITAMIN D3) 10(400)/ML
DROPS ORAL
Qty: 400 | Refills: 2 | Status: ACTIVE | COMMUNITY
Start: 2024-09-10 | End: 1900-01-01

## 2024-09-10 RX ORDER — BLOOD-GLUCOSE METER
W/DEVICE KIT MISCELLANEOUS
Qty: 1 | Refills: 0 | Status: ACTIVE | COMMUNITY
Start: 2024-09-10 | End: 1900-01-01

## 2024-09-25 ENCOUNTER — APPOINTMENT (OUTPATIENT)
Dept: OBGYN | Facility: CLINIC | Age: 29
End: 2024-09-25

## 2024-09-25 ENCOUNTER — NON-APPOINTMENT (OUTPATIENT)
Age: 29
End: 2024-09-25

## 2024-09-25 VITALS
HEART RATE: 108 BPM | DIASTOLIC BLOOD PRESSURE: 79 MMHG | WEIGHT: 159 LBS | SYSTOLIC BLOOD PRESSURE: 121 MMHG | HEIGHT: 61 IN | BODY MASS INDEX: 30.02 KG/M2

## 2024-09-25 PROCEDURE — 0502F SUBSEQUENT PRENATAL CARE: CPT

## 2024-10-07 ENCOUNTER — APPOINTMENT (OUTPATIENT)
Dept: ANTEPARTUM | Facility: CLINIC | Age: 29
End: 2024-10-07
Payer: COMMERCIAL

## 2024-10-07 ENCOUNTER — ASOB RESULT (OUTPATIENT)
Age: 29
End: 2024-10-07

## 2024-10-07 PROCEDURE — 76816 OB US FOLLOW-UP PER FETUS: CPT

## 2024-10-07 PROCEDURE — 76819 FETAL BIOPHYS PROFIL W/O NST: CPT | Mod: 59

## 2024-10-23 ENCOUNTER — NON-APPOINTMENT (OUTPATIENT)
Age: 29
End: 2024-10-23

## 2024-10-23 ENCOUNTER — APPOINTMENT (OUTPATIENT)
Dept: OBGYN | Facility: CLINIC | Age: 29
End: 2024-10-23
Payer: COMMERCIAL

## 2024-10-23 VITALS
BODY MASS INDEX: 31.15 KG/M2 | DIASTOLIC BLOOD PRESSURE: 77 MMHG | HEART RATE: 98 BPM | SYSTOLIC BLOOD PRESSURE: 112 MMHG | WEIGHT: 165 LBS | HEIGHT: 61 IN

## 2024-10-23 PROCEDURE — 0502F SUBSEQUENT PRENATAL CARE: CPT

## 2024-11-18 ENCOUNTER — ASOB RESULT (OUTPATIENT)
Age: 29
End: 2024-11-18

## 2024-11-18 ENCOUNTER — APPOINTMENT (OUTPATIENT)
Dept: ANTEPARTUM | Facility: CLINIC | Age: 29
End: 2024-11-18
Payer: COMMERCIAL

## 2024-11-18 PROCEDURE — 76816 OB US FOLLOW-UP PER FETUS: CPT

## 2024-11-18 PROCEDURE — 76819 FETAL BIOPHYS PROFIL W/O NST: CPT

## 2024-11-20 ENCOUNTER — APPOINTMENT (OUTPATIENT)
Dept: OBGYN | Facility: CLINIC | Age: 29
End: 2024-11-20

## 2024-11-22 ENCOUNTER — APPOINTMENT (OUTPATIENT)
Dept: OBGYN | Facility: CLINIC | Age: 29
End: 2024-11-22

## 2024-11-22 VITALS
SYSTOLIC BLOOD PRESSURE: 116 MMHG | DIASTOLIC BLOOD PRESSURE: 80 MMHG | HEIGHT: 61 IN | HEART RATE: 99 BPM | WEIGHT: 166 LBS | BODY MASS INDEX: 31.34 KG/M2

## 2024-11-22 DIAGNOSIS — Z34.93 ENCOUNTER FOR SUPERVISION OF NORMAL PREGNANCY, UNSPECIFIED, THIRD TRIMESTER: ICD-10-CM

## 2024-11-22 PROCEDURE — 0502F SUBSEQUENT PRENATAL CARE: CPT

## 2024-11-25 DIAGNOSIS — O99.013 ANEMIA COMPLICATING PREGNANCY, THIRD TRIMESTER: ICD-10-CM

## 2024-11-25 LAB
BASOPHILS # BLD AUTO: 0.03 K/UL
BASOPHILS NFR BLD AUTO: 0.3 %
EOSINOPHIL # BLD AUTO: 0.09 K/UL
EOSINOPHIL NFR BLD AUTO: 1 %
GP B STREP DNA SPEC QL NAA+PROBE: DETECTED
HCT VFR BLD CALC: 30.8 %
HGB BLD-MCNC: 9.9 G/DL
IMM GRANULOCYTES NFR BLD AUTO: 0.2 %
LYMPHOCYTES # BLD AUTO: 1.29 K/UL
LYMPHOCYTES NFR BLD AUTO: 14 %
MAN DIFF?: NORMAL
MCHC RBC-ENTMCNC: 28 PG
MCHC RBC-ENTMCNC: 32.1 G/DL
MCV RBC AUTO: 87 FL
MONOCYTES # BLD AUTO: 0.74 K/UL
MONOCYTES NFR BLD AUTO: 8 %
NEUTROPHILS # BLD AUTO: 7.04 K/UL
NEUTROPHILS NFR BLD AUTO: 76.5 %
PLATELET # BLD AUTO: 222 K/UL
RBC # BLD: 3.54 M/UL
RBC # FLD: 13.2 %
SOURCE GBS: NORMAL
WBC # FLD AUTO: 9.21 K/UL

## 2024-11-25 RX ORDER — CHLORHEXIDINE GLUCONATE 4 %
325 (65 FE) LIQUID (ML) TOPICAL
Qty: 30 | Refills: 3 | Status: ACTIVE | COMMUNITY
Start: 2024-11-25 | End: 1900-01-01
